# Patient Record
Sex: MALE | Race: WHITE | NOT HISPANIC OR LATINO | Employment: UNEMPLOYED | URBAN - NONMETROPOLITAN AREA
[De-identification: names, ages, dates, MRNs, and addresses within clinical notes are randomized per-mention and may not be internally consistent; named-entity substitution may affect disease eponyms.]

---

## 2018-01-01 ENCOUNTER — TELEPHONE (OUTPATIENT)
Dept: CARDIOLOGY | Facility: CLINIC | Age: 58
End: 2018-01-01

## 2018-01-01 ENCOUNTER — OUTSIDE FACILITY SERVICE (OUTPATIENT)
Dept: CARDIOLOGY | Facility: CLINIC | Age: 58
End: 2018-01-01

## 2018-01-01 ENCOUNTER — OFFICE VISIT (OUTPATIENT)
Dept: CARDIOLOGY | Facility: CLINIC | Age: 58
End: 2018-01-01

## 2018-01-01 ENCOUNTER — HOSPITAL ENCOUNTER (OUTPATIENT)
Dept: CARDIOLOGY | Facility: HOSPITAL | Age: 58
Discharge: HOME | End: 2018-06-28
Admitting: PHYSICIAN ASSISTANT

## 2018-01-01 ENCOUNTER — PRIOR AUTHORIZATION (OUTPATIENT)
Dept: CARDIOLOGY | Facility: CLINIC | Age: 58
End: 2018-01-01

## 2018-01-01 ENCOUNTER — LAB (OUTPATIENT)
Dept: LAB | Facility: HOSPITAL | Age: 58
End: 2018-01-01

## 2018-01-01 ENCOUNTER — HOSPITAL ENCOUNTER (OUTPATIENT)
Dept: CARDIOLOGY | Facility: HOSPITAL | Age: 58
Discharge: HOME | End: 2018-07-23

## 2018-01-01 ENCOUNTER — HOSPITAL ENCOUNTER (OUTPATIENT)
Dept: CARDIOLOGY | Facility: HOSPITAL | Age: 58
Discharge: HOME | End: 2018-07-23
Admitting: INTERNAL MEDICINE

## 2018-01-01 ENCOUNTER — OFFICE VISIT (OUTPATIENT)
Dept: CARDIAC SURGERY | Facility: CLINIC | Age: 58
End: 2018-01-01

## 2018-01-01 ENCOUNTER — APPOINTMENT (OUTPATIENT)
Dept: CARDIOLOGY | Facility: HOSPITAL | Age: 58
End: 2018-01-01

## 2018-01-01 VITALS
OXYGEN SATURATION: 97 % | HEIGHT: 68 IN | DIASTOLIC BLOOD PRESSURE: 94 MMHG | HEART RATE: 94 BPM | WEIGHT: 183.8 LBS | SYSTOLIC BLOOD PRESSURE: 152 MMHG | BODY MASS INDEX: 27.86 KG/M2

## 2018-01-01 VITALS
HEIGHT: 68 IN | SYSTOLIC BLOOD PRESSURE: 151 MMHG | WEIGHT: 183.4 LBS | BODY MASS INDEX: 27.8 KG/M2 | DIASTOLIC BLOOD PRESSURE: 87 MMHG | HEART RATE: 86 BPM | OXYGEN SATURATION: 98 %

## 2018-01-01 VITALS
DIASTOLIC BLOOD PRESSURE: 76 MMHG | OXYGEN SATURATION: 97 % | SYSTOLIC BLOOD PRESSURE: 121 MMHG | HEIGHT: 68 IN | BODY MASS INDEX: 28.19 KG/M2 | HEART RATE: 87 BPM | WEIGHT: 186 LBS

## 2018-01-01 VITALS
BODY MASS INDEX: 27.2 KG/M2 | WEIGHT: 179.5 LBS | HEART RATE: 105 BPM | SYSTOLIC BLOOD PRESSURE: 152 MMHG | DIASTOLIC BLOOD PRESSURE: 90 MMHG | HEIGHT: 68 IN | OXYGEN SATURATION: 97 %

## 2018-01-01 VITALS
BODY MASS INDEX: 28.01 KG/M2 | OXYGEN SATURATION: 98 % | SYSTOLIC BLOOD PRESSURE: 141 MMHG | HEIGHT: 68 IN | TEMPERATURE: 97.5 F | HEART RATE: 75 BPM | DIASTOLIC BLOOD PRESSURE: 91 MMHG | WEIGHT: 184.8 LBS

## 2018-01-01 DIAGNOSIS — R93.89 ABNORMAL CAROTID ULTRASOUND: ICD-10-CM

## 2018-01-01 DIAGNOSIS — R06.02 SOB (SHORTNESS OF BREATH): ICD-10-CM

## 2018-01-01 DIAGNOSIS — R00.2 PALPITATIONS: ICD-10-CM

## 2018-01-01 DIAGNOSIS — R06.02 SHORTNESS OF BREATH: ICD-10-CM

## 2018-01-01 DIAGNOSIS — R07.2 PRECORDIAL PAIN: Primary | ICD-10-CM

## 2018-01-01 DIAGNOSIS — I10 ESSENTIAL HYPERTENSION: ICD-10-CM

## 2018-01-01 DIAGNOSIS — Z00.6 EXAMINATION FOR NORMAL COMPARISON FOR CLINICAL RESEARCH: Primary | ICD-10-CM

## 2018-01-01 DIAGNOSIS — I25.119 CORONARY ARTERY DISEASE INVOLVING NATIVE CORONARY ARTERY OF NATIVE HEART WITH ANGINA PECTORIS (HCC): Primary | ICD-10-CM

## 2018-01-01 DIAGNOSIS — R07.2 PRECORDIAL PAIN: ICD-10-CM

## 2018-01-01 DIAGNOSIS — I77.9 BILATERAL CAROTID ARTERY DISEASE (HCC): ICD-10-CM

## 2018-01-01 DIAGNOSIS — Z72.0 TOBACCO ABUSE: Primary | ICD-10-CM

## 2018-01-01 DIAGNOSIS — I65.23 BILATERAL CAROTID ARTERY STENOSIS: Primary | ICD-10-CM

## 2018-01-01 DIAGNOSIS — F17.200 SMOKING: Primary | ICD-10-CM

## 2018-01-01 DIAGNOSIS — E78.5 HYPERLIPIDEMIA, UNSPECIFIED HYPERLIPIDEMIA TYPE: ICD-10-CM

## 2018-01-01 DIAGNOSIS — Z00.00 HEALTHCARE MAINTENANCE: ICD-10-CM

## 2018-01-01 DIAGNOSIS — I25.119 CORONARY ARTERY DISEASE INVOLVING NATIVE CORONARY ARTERY OF NATIVE HEART WITH ANGINA PECTORIS (HCC): ICD-10-CM

## 2018-01-01 DIAGNOSIS — F17.200 SMOKING: ICD-10-CM

## 2018-01-01 DIAGNOSIS — I42.9 CARDIOMYOPATHY, UNSPECIFIED TYPE (HCC): ICD-10-CM

## 2018-01-01 DIAGNOSIS — R42 DIZZINESS: ICD-10-CM

## 2018-01-01 LAB
ANION GAP SERPL CALCULATED.3IONS-SCNC: 3.6 MMOL/L (ref 3.6–11.2)
BUN BLD-MCNC: 14 MG/DL (ref 7–21)
BUN/CREAT SERPL: 13.9 (ref 7–25)
CALCIUM SPEC-SCNC: 9.5 MG/DL (ref 7.7–10)
CHLORIDE SERPL-SCNC: 106 MMOL/L (ref 99–112)
CO2 SERPL-SCNC: 25.4 MMOL/L (ref 24.3–31.9)
CREAT BLD-MCNC: 1.01 MG/DL (ref 0.43–1.29)
GFR SERPL CREATININE-BSD FRML MDRD: 75 ML/MIN/1.73
GLUCOSE BLD-MCNC: 94 MG/DL (ref 70–110)
MAGNESIUM SERPL-MCNC: 2.1 MG/DL (ref 1.7–2.6)
MAXIMAL PREDICTED HEART RATE: 160 BPM
MAXIMAL PREDICTED HEART RATE: 160 BPM
OSMOLALITY SERPL CALC.SUM OF ELEC: 270.3 MOSM/KG (ref 273–305)
POTASSIUM BLD-SCNC: 3.9 MMOL/L (ref 3.5–5.3)
SODIUM BLD-SCNC: 135 MMOL/L (ref 135–153)
STRESS TARGET HR: 136 BPM
STRESS TARGET HR: 136 BPM

## 2018-01-01 PROCEDURE — 36415 COLL VENOUS BLD VENIPUNCTURE: CPT

## 2018-01-01 PROCEDURE — 78472 GATED HEART PLANAR SINGLE: CPT

## 2018-01-01 PROCEDURE — 93306 TTE W/DOPPLER COMPLETE: CPT

## 2018-01-01 PROCEDURE — 93306 TTE W/DOPPLER COMPLETE: CPT | Performed by: INTERNAL MEDICINE

## 2018-01-01 PROCEDURE — 93880 EXTRACRANIAL BILAT STUDY: CPT | Performed by: INTERNAL MEDICINE

## 2018-01-01 PROCEDURE — 93000 ELECTROCARDIOGRAM COMPLETE: CPT | Performed by: PHYSICIAN ASSISTANT

## 2018-01-01 PROCEDURE — 0 TECHNETIUM LABELED RED BLOOD CELLS: Performed by: INTERNAL MEDICINE

## 2018-01-01 PROCEDURE — 25010000002 HEPARIN FLUSH (PORCINE) 100 UNIT/ML SOLUTION: Performed by: INTERNAL MEDICINE

## 2018-01-01 PROCEDURE — 99203 OFFICE O/P NEW LOW 30 MIN: CPT | Performed by: PHYSICIAN ASSISTANT

## 2018-01-01 PROCEDURE — 99214 OFFICE O/P EST MOD 30 MIN: CPT | Performed by: NURSE PRACTITIONER

## 2018-01-01 PROCEDURE — 80048 BASIC METABOLIC PNL TOTAL CA: CPT | Performed by: NURSE PRACTITIONER

## 2018-01-01 PROCEDURE — 93880 EXTRACRANIAL BILAT STUDY: CPT

## 2018-01-01 PROCEDURE — A9560 TC99M LABELED RBC: HCPCS | Performed by: INTERNAL MEDICINE

## 2018-01-01 PROCEDURE — 99205 OFFICE O/P NEW HI 60 MIN: CPT | Performed by: THORACIC SURGERY (CARDIOTHORACIC VASCULAR SURGERY)

## 2018-01-01 PROCEDURE — 78472 GATED HEART PLANAR SINGLE: CPT | Performed by: INTERNAL MEDICINE

## 2018-01-01 PROCEDURE — 93458 L HRT ARTERY/VENTRICLE ANGIO: CPT | Performed by: INTERNAL MEDICINE

## 2018-01-01 PROCEDURE — 83735 ASSAY OF MAGNESIUM: CPT | Performed by: NURSE PRACTITIONER

## 2018-01-01 RX ORDER — ATORVASTATIN CALCIUM 20 MG/1
20 TABLET, FILM COATED ORAL DAILY
Qty: 90 TABLET | Refills: 3 | Status: SHIPPED | OUTPATIENT
Start: 2018-01-01 | End: 2018-01-01 | Stop reason: SDUPTHER

## 2018-01-01 RX ORDER — NITROGLYCERIN 0.4 MG/1
TABLET SUBLINGUAL
Qty: 25 TABLET | Refills: 3 | Status: SHIPPED | OUTPATIENT
Start: 2018-01-01

## 2018-01-01 RX ORDER — ATORVASTATIN CALCIUM 40 MG/1
40 TABLET, FILM COATED ORAL NIGHTLY
Qty: 30 TABLET | Refills: 11 | Status: SHIPPED | OUTPATIENT
Start: 2018-01-01 | End: 2018-01-01 | Stop reason: SDUPTHER

## 2018-01-01 RX ORDER — METOPROLOL TARTRATE 50 MG/1
50 TABLET, FILM COATED ORAL 2 TIMES DAILY
COMMUNITY
Start: 2018-01-01

## 2018-01-01 RX ORDER — CLOPIDOGREL BISULFATE 75 MG/1
75 TABLET ORAL DAILY
Qty: 30 TABLET | Refills: 11 | Status: SHIPPED | OUTPATIENT
Start: 2018-01-01

## 2018-01-01 RX ORDER — ISOSORBIDE MONONITRATE 30 MG/1
30 TABLET, EXTENDED RELEASE ORAL DAILY
Qty: 30 TABLET | Refills: 11 | Status: SHIPPED | OUTPATIENT
Start: 2018-01-01

## 2018-01-01 RX ORDER — ATORVASTATIN CALCIUM 40 MG/1
40 TABLET, FILM COATED ORAL NIGHTLY
Qty: 30 TABLET | Refills: 11 | Status: SHIPPED | OUTPATIENT
Start: 2018-01-01

## 2018-01-01 RX ORDER — NICOTINE 21 MG/24HR
1 PATCH, TRANSDERMAL 24 HOURS TRANSDERMAL EVERY 24 HOURS
Qty: 30 PATCH | Refills: 0 | Status: SHIPPED | OUTPATIENT
Start: 2018-01-01 | End: 2018-01-01

## 2018-01-01 RX ORDER — ISOSORBIDE MONONITRATE 30 MG/1
TABLET, EXTENDED RELEASE ORAL
Qty: 30 TABLET | Refills: 11 | Status: SHIPPED | OUTPATIENT
Start: 2018-01-01 | End: 2018-01-01 | Stop reason: SDUPTHER

## 2018-01-01 RX ADMIN — TECHNETIUM TC 99M-LABELED RED BLOOD CELLS 1 DOSE: KIT at 16:00

## 2018-01-01 RX ADMIN — HEPARIN SODIUM (PORCINE) LOCK FLUSH IV SOLN 100 UNIT/ML 10 UNITS: 100 SOLUTION at 16:00

## 2018-05-31 NOTE — PATIENT INSTRUCTIONS
BMI for Adults  Body mass index (BMI) is a number that is calculated from a person's weight and height. In most adults, the number is used to find how much of an adult's weight is made up of fat. BMI is not as accurate as a direct measure of body fat.  How is BMI calculated?  BMI is calculated by dividing weight in kilograms by height in meters squared. It can also be calculated by dividing weight in pounds by height in inches squared, then multiplying the resulting number by 703. Charts are available to help you find your BMI quickly and easily without doing this calculation.  How is BMI interpreted?  Health care professionals use BMI charts to identify whether an adult is underweight, at a normal weight, or overweight based on the following guidelines:  · Underweight: BMI less than 18.5.  · Normal weight: BMI between 18.5 and 24.9.  · Overweight: BMI between 25 and 29.9.  · Obese: BMI of 30 and above.  BMI is usually interpreted the same for males and females.  Weight includes both fat and muscle, so someone with a muscular build, such as an athlete, may have a BMI that is higher than 24.9. In cases like these, BMI may not accurately depict body fat. To determine if excess body fat is the cause of a BMI of 25 or higher, further assessments may need to be done by a health care provider.  Why is BMI a useful tool?  BMI is used to identify a possible weight problem that may be related to a medical problem or may increase the risk for medical problems. BMI can also be used to promote changes to reach a healthy weight.  This information is not intended to replace advice given to you by your health care provider. Make sure you discuss any questions you have with your health care provider.  Document Released: 08/29/2005 Document Revised: 04/27/2017 Document Reviewed: 05/15/2015  [employer] Interactive Patient Education © 2017 [employer]  For more information:    Quit Now  [place]  1-800-QUIT-NOW  https://[place].quitlogix.org/en-[place]/  Steps to Quit Smoking  Smoking tobacco can be harmful to your health and can affect almost every organ in your body. Smoking puts you, and those around you, at risk for developing many serious chronic diseases. Quitting smoking is difficult, but it is one of the best things that you can do for your health. It is never too late to quit.  What are the benefits of quitting smoking?  When you quit smoking, you lower your risk of developing serious diseases and conditions, such as:  · Lung cancer or lung disease, such as COPD.  · Heart disease.  · Stroke.  · Heart attack.  · Infertility.  · Osteoporosis and bone fractures.  Additionally, symptoms such as coughing, wheezing, and shortness of breath may get better when you quit. You may also find that you get sick less often because your body is stronger at fighting off colds and infections. If you are pregnant, quitting smoking can help to reduce your chances of having a baby of low birth weight.  How do I get ready to quit?  When you decide to quit smoking, create a plan to make sure that you are successful. Before you quit:  · Pick a date to quit. Set a date within the next two weeks to give you time to prepare.  · Write down the reasons why you are quitting. Keep this list in places where you will see it often, such as on your bathroom mirror or in your car or wallet.  · Identify the people, places, things, and activities that make you want to smoke (triggers) and avoid them. Make sure to take these actions:  ¨ Throw away all cigarettes at home, at work, and in your car.  ¨ Throw away smoking accessories, such as ashtrays and lighters.  ¨ Clean your car and make sure to empty the ashtray.  ¨ Clean your home, including curtains and carpets.  · Tell your family, friends, and coworkers that you are quitting. Support from your loved ones can make quitting easier.  · Talk with your health care provider about  your options for quitting smoking.  · Find out what treatment options are covered by your health insurance.  What strategies can I use to quit smoking?  Talk with your healthcare provider about different strategies to quit smoking. Some strategies include:  · Quitting smoking altogether instead of gradually lessening how much you smoke over a period of time. Research shows that quitting “cold turkey” is more successful than gradually quitting.  · Attending in-person counseling to help you build problem-solving skills. You are more likely to have success in quitting if you attend several counseling sessions. Even short sessions of 10 minutes can be effective.  · Finding resources and support systems that can help you to quit smoking and remain smoke-free after you quit. These resources are most helpful when you use them often. They can include:  ¨ Online chats with a counselor.  ¨ Telephone quitlines.  ¨ Printed self-help materials.  ¨ Support groups or group counseling.  ¨ Text messaging programs.  ¨ Mobile phone applications.  · Taking medicines to help you quit smoking. (If you are pregnant or breastfeeding, talk with your health care provider first.) Some medicines contain nicotine and some do not. Both types of medicines help with cravings, but the medicines that include nicotine help to relieve withdrawal symptoms. Your health care provider may recommend:  ¨ Nicotine patches, gum, or lozenges.  ¨ Nicotine inhalers or sprays.  ¨ Non-nicotine medicine that is taken by mouth.  Talk with your health care provider about combining strategies, such as taking medicines while you are also receiving in-person counseling. Using these two strategies together makes you more likely to succeed in quitting than if you used either strategy on its own.  If you are pregnant or breastfeeding, talk with your health care provider about finding counseling or other support strategies to quit smoking. Do not take medicine to help you  quit smoking unless told to do so by your health care provider.  What things can I do to make it easier to quit?  Quitting smoking might feel overwhelming at first, but there is a lot that you can do to make it easier. Take these important actions:  · Reach out to your family and friends and ask that they support and encourage you during this time. Call telephone quitlines, reach out to support groups, or work with a counselor for support.  · Ask people who smoke to avoid smoking around you.  · Avoid places that trigger you to smoke, such as bars, parties, or smoke-break areas at work.  · Spend time around people who do not smoke.  · Lessen stress in your life, because stress can be a smoking trigger for some people. To lessen stress, try:  ¨ Exercising regularly.  ¨ Deep-breathing exercises.  ¨ Yoga.  ¨ Meditating.  ¨ Performing a body scan. This involves closing your eyes, scanning your body from head to toe, and noticing which parts of your body are particularly tense. Purposefully relax the muscles in those areas.  · Download or purchase mobile phone or tablet apps (applications) that can help you stick to your quit plan by providing reminders, tips, and encouragement. There are many free apps, such as QuitGuide from the CDC (Centers for Disease Control and Prevention). You can find other support for quitting smoking (smoking cessation) through smokefree.gov and other websites.  How will I feel when I quit smoking?  Within the first 24 hours of quitting smoking, you may start to feel some withdrawal symptoms. These symptoms are usually most noticeable 2-3 days after quitting, but they usually do not last beyond 2-3 weeks. Changes or symptoms that you might experience include:  · Mood swings.  · Restlessness, anxiety, or irritation.  · Difficulty concentrating.  · Dizziness.  · Strong cravings for sugary foods in addition to nicotine.  · Mild weight gain.  · Constipation.  · Nausea.  · Coughing or a sore  throat.  · Changes in how your medicines work in your body.  · A depressed mood.  · Difficulty sleeping (insomnia).  After the first 2-3 weeks of quitting, you may start to notice more positive results, such as:  · Improved sense of smell and taste.  · Decreased coughing and sore throat.  · Slower heart rate.  · Lower blood pressure.  · Clearer skin.  · The ability to breathe more easily.  · Fewer sick days.  Quitting smoking is very challenging for most people. Do not get discouraged if you are not successful the first time. Some people need to make many attempts to quit before they achieve long-term success. Do your best to stick to your quit plan, and talk with your health care provider if you have any questions or concerns.  This information is not intended to replace advice given to you by your health care provider. Make sure you discuss any questions you have with your health care provider.  Document Released: 12/12/2002 Document Revised: 08/15/2017 Document Reviewed: 05/03/2016  [employer] Interactive Patient Education © 2017 [employer]

## 2018-05-31 NOTE — PROGRESS NOTES
Subjective   Keshav Orta Jr. is a 58 y.o. male     Chief Complaint   Patient presents with   • Establish Care     patient appears in office today to est cardiac care    • Chest Pain   • Shortness of Breath       HPI    The patient presents today for initial evaluation.  He presents in the setting of chest discomfort, shortness of air, hypertension, and tachycardia.  The patient denies cardiovascular history.  He does tell me that several days ago, proximally up to 2 weeks ago, he was seen through an urgent treatment Center in [place], [place].  He presented after significant onset of chest tightness, left upper extremity discomfort, nausea, and diaphoresis.  He also had hypertension and tachycardia upon arrival and evaluation.  Cardiac enzymes were performed.  Cardiac enzymes ×2 per protocol were unremarkable.  It was recommended however in the setting of abnormal EKG, that he be referred to [place], [place], for consideration of cardiac evaluation.  With review of that note, the provider at that time reported that he had elevated troponin.  I do not see elevated troponin on 2 separate draws.  He does have diffuse nonspecific ST and T-wave changes however on his baseline EKG.    Since evaluation to the urgent treatment Center, the patient has continued to have chest discomfort.  He tells me that with activity, he will have intense precordial chest pressure.  This again is referred occasionally to the left neck and left upper extremity.  He gets very dyspneic.  Once resting, his chest pain resolves.  The patient has no PND orthopnea otherwise.  He reports episodes of tachycardia, typically associated with stress/anxiety.  When checked, he is hypertensive at that time as well.  The patient denies dizziness or syncope.  He presents today for evaluation of his symptoms and abnormal EKG.    Current Outpatient Prescriptions   Medication Sig Dispense Refill   • aspirin 81 MG tablet Take 1 tablet by mouth Daily. 30  tablet 11   • atorvastatin (LIPITOR) 20 MG tablet Take 1 tablet by mouth Daily. 90 tablet 3   • clopidogrel (PLAVIX) 75 MG tablet Take 1 tablet by mouth Daily. 30 tablet 11   • metoprolol tartrate (LOPRESSOR) 25 MG tablet Take 1 tablet by mouth 2 (Two) Times a Day. 60 tablet 11   • nitroglycerin (NITROSTAT) 0.4 MG SL tablet 1 under the tongue as needed for angina, may repeat q5mins for up three doses 25 tablet 3     No current facility-administered medications for this visit.        Patient has no known allergies.    Past Medical History:   Diagnosis Date   • Hypertension    • Myocardial infarction        Social History     Social History   • Marital status:      Spouse name: N/A   • Number of children: N/A   • Years of education: N/A     Occupational History   • Not on file.     Social History Main Topics   • Smoking status: Current Every Day Smoker     Packs/day: 1.50     Years: 45.00     Types: Cigarettes   • Smokeless tobacco: Never Used   • Alcohol use No   • Drug use: No   • Sexual activity: Defer     Other Topics Concern   • Not on file     Social History Narrative   • No narrative on file       Family History   Problem Relation Age of Onset   • COPD Mother    • Heart disease Mother    • Hypertension Father    • Myasthenia gravis Father        Review of Systems   Constitutional: Positive for fatigue (easily fatigued ).   HENT: Positive for congestion (head congestion due to allergies) and rhinorrhea (runny nose due to allergies). Negative for sneezing (sneezing due to allergies) and sore throat.    Eyes: Negative.  Negative for visual disturbance.   Respiratory: Positive for cough (dry cough due to tobacco use), chest tightness (chest tightness with CP episodes) and shortness of breath (easily SOA ; worse on exertion ). Negative for apnea and wheezing.    Cardiovascular: Positive for chest pain (CP/tightness/chest pressure), palpitations (frequent palpitations/flutters) and leg swelling (BLE  "swelling/edema with exertion ).   Gastrointestinal: Positive for nausea (nausea with CP ). Negative for abdominal distention, abdominal pain and vomiting.   Endocrine: Negative.  Negative for cold intolerance, heat intolerance, polyphagia and polyuria.   Genitourinary: Negative.  Negative for difficulty urinating, frequency and urgency.   Musculoskeletal: Negative.  Negative for arthralgias, back pain, myalgias, neck pain and neck stiffness.   Skin: Negative.  Negative for rash and wound.   Allergic/Immunologic: Negative.  Negative for environmental allergies and food allergies.   Neurological: Positive for weakness (generalized ) and numbness (\"R side of head feels numb\"). Negative for dizziness, light-headedness and headaches.   Hematological: Negative.  Does not bruise/bleed easily.   Psychiatric/Behavioral: Positive for agitation (easily agitated ). Negative for confusion and sleep disturbance (denies waking up smothering/SOA). The patient is nervous/anxious (easily nervous/anxious).        Objective     Vitals:    05/31/18 1455   BP: 152/90   BP Location: Left arm   Patient Position: Sitting   Pulse: 105   SpO2: 97%   Weight: 81.4 kg (179 lb 8 oz)   Height: 172.7 cm (68\")        /90 (BP Location: Left arm, Patient Position: Sitting)   Pulse 105   Ht 172.7 cm (68\")   Wt 81.4 kg (179 lb 8 oz)   SpO2 97%   BMI 27.29 kg/m²      Lab Results (most recent)     None          Physical Exam   Constitutional: He is oriented to person, place, and time. He appears well-developed and well-nourished. No distress.   HENT:   Head: Normocephalic and atraumatic.   Eyes: Conjunctivae and EOM are normal. Pupils are equal, round, and reactive to light.   Neck: Normal range of motion. Neck supple. No JVD present. No tracheal deviation present.   Cardiovascular: Normal rate, regular rhythm, normal heart sounds and intact distal pulses.    Pulmonary/Chest: Effort normal and breath sounds normal.   Abdominal: Soft. Bowel " sounds are normal. He exhibits no distension and no mass. There is no tenderness. There is no rebound and no guarding.   Musculoskeletal: Normal range of motion. He exhibits no edema, tenderness or deformity.   Neurological: He is alert and oriented to person, place, and time.   Skin: Skin is warm and dry. No rash noted. No erythema. No pallor.   Psychiatric: He has a normal mood and affect. His behavior is normal. Judgment and thought content normal.   Nursing note and vitals reviewed.      Procedure     ECG 12 Lead  Date/Time: 5/31/2018 3:01 PM  Performed by: CHARLINE RODRIGUEZ  Authorized by: CHARLINE RODRIGUEZ   Comments: Chest pain   SOA    Sinus rhythm, normal axis, nonspecific IVCD, possible inferior wall MI age indeterminate, nonspecific ST and T-wave changes, left atrial enlargement, no acute changes noted.                 Assessment/Plan      Diagnosis Plan   1. Precordial pain  ECG 12 Lead    aspirin 81 MG tablet    clopidogrel (PLAVIX) 75 MG tablet    atorvastatin (LIPITOR) 20 MG tablet    nitroglycerin (NITROSTAT) 0.4 MG SL tablet    metoprolol tartrate (LOPRESSOR) 25 MG tablet   2. Essential hypertension  aspirin 81 MG tablet    clopidogrel (PLAVIX) 75 MG tablet    atorvastatin (LIPITOR) 20 MG tablet    nitroglycerin (NITROSTAT) 0.4 MG SL tablet    metoprolol tartrate (LOPRESSOR) 25 MG tablet   3. SOB (shortness of breath)  aspirin 81 MG tablet    clopidogrel (PLAVIX) 75 MG tablet    atorvastatin (LIPITOR) 20 MG tablet    nitroglycerin (NITROSTAT) 0.4 MG SL tablet    metoprolol tartrate (LOPRESSOR) 25 MG tablet   4. Palpitations  aspirin 81 MG tablet    clopidogrel (PLAVIX) 75 MG tablet    atorvastatin (LIPITOR) 20 MG tablet    nitroglycerin (NITROSTAT) 0.4 MG SL tablet    metoprolol tartrate (LOPRESSOR) 25 MG tablet     We have discussed further evaluation in terms of symptoms with the patient.  He is going need ischemia assessment at some time.  I'm going to put him on cardioprotective medications for  now.  We are starting aspirin.  I have asked that he start Plavix 75 mg daily.  We will also start Lipitor 20 mg daily at bedtime.  For further cardioprotection, we will start metoprolol 25 mg twice a day.  This hopefully will help with hypertension and tachycardia.  The patient will have Nitrol also called in, as needed.  If symptoms persist, we will have to consider testing at that time.  He will eventually need an echocardiogram.  Further pending all the above however.  For any complications, he will call right away.  For breakthrough symptoms, he will go to the emergency room.           I advised the patient of the risks in continuing to use tobacco, and I provided this patient with smoking cessation educational materials.    During this visit, I spent <3 minutes counseling the patient regarding smoking cessation.     Patient's Body mass index is 27.29 kg/m². BMI is above normal parameters. Recommendations include: educational material and referral to primary care.           Electronically signed by:

## 2018-06-06 NOTE — TELEPHONE ENCOUNTER
Per verbal order John Gong PA-C patient needs to be scheduled for echo now and LHC. Orders will be placed within system . -EMIL;SREEKANTH    I called patient and discussed insurance coverage with him, SHANI Arthur pulled insurance from [facility] website and will be putting all this into patients chart as well, so echo and LHC can be scheduled. -EMIL;SREEKANTH

## 2018-06-06 NOTE — TELEPHONE ENCOUNTER
----- Message from Cony Domínguez LPN sent at 6/6/2018 10:06 AM EDT -----  Contact: ALYSSA   DAUGHTER  Please ask Charline if he is wanting to schedule patient for Cardiac Cath. There is nothing in the last office note or an order for cath only need for further ischemic cardiac evaluation.   ----- Message -----  From: Meng Panchal  Sent: 6/6/2018   9:47 AM  To: Cony Domínguez LPN    PATIENT DAUGHTER CALLED AND STATED THAT THE PATIENT NOW HAS INSURANCE AND REQUESTING THAT HIS HEART CATH BE SCHEDULED  THAT CHARLINE RODRIGUEZ WANTED HIM TO HAVE DONE.

## 2018-07-02 PROBLEM — I10 ESSENTIAL HYPERTENSION: Status: ACTIVE | Noted: 2018-01-01

## 2018-07-02 PROBLEM — I25.119 CORONARY ARTERY DISEASE INVOLVING NATIVE CORONARY ARTERY OF NATIVE HEART WITH ANGINA PECTORIS (HCC): Status: ACTIVE | Noted: 2018-01-01

## 2018-07-02 PROBLEM — F17.200 SMOKING: Status: ACTIVE | Noted: 2018-01-01

## 2018-07-02 PROBLEM — E78.5 HYPERLIPIDEMIA: Status: ACTIVE | Noted: 2018-01-01

## 2018-07-02 NOTE — PATIENT INSTRUCTIONS
Obesity, Adult  Obesity is the condition of having too much total body fat. Being overweight or obese means that your weight is greater than what is considered healthy for your body size. Obesity is determined by a measurement called BMI. BMI is an estimate of body fat and is calculated from height and weight. For adults, a BMI of 30 or higher is considered obese.  Obesity can eventually lead to other health concerns and major illnesses, including:  · Stroke.  · Coronary artery disease (CAD).  · Type 2 diabetes.  · Some types of cancer, including cancers of the colon, breast, uterus, and gallbladder.  · Osteoarthritis.  · High blood pressure (hypertension).  · High cholesterol.  · Sleep apnea.  · Gallbladder stones.  · Infertility problems.    What are the causes?  The main cause of obesity is taking in (consuming) more calories than your body uses for energy. Other factors that contribute to this condition may include:  · Being born with genes that make you more likely to become obese.  · Having a medical condition that causes obesity. These conditions include:  ? Hypothyroidism.  ? Polycystic ovarian syndrome (PCOS).  ? Binge-eating disorder.  ? Cushing syndrome.  · Taking certain medicines, such as steroids, antidepressants, and seizure medicines.  · Not being physically active (sedentary lifestyle).  · Living where there are limited places to exercise safely or buy healthy foods.  · Not getting enough sleep.    What increases the risk?  The following factors may increase your risk of this condition:  · Having a family history of obesity.  · Being a woman of -American descent.  · Being a man of  descent.    What are the signs or symptoms?  Having excessive body fat is the main symptom of this condition.  How is this diagnosed?  This condition may be diagnosed based on:  · Your symptoms.  · Your medical history.  · A physical exam. Your health care provider may measure:  ? Your BMI. If you are an  adult with a BMI between 25 and less than 30, you are considered overweight. If you are an adult with a BMI of 30 or higher, you are considered obese.  ? The distances around your hips and your waist (circumferences). These may be compared to each other to help diagnose your condition.  ? Your skinfold thickness. Your health care provider may gently pinch a fold of your skin and measure it.    How is this treated?  Treatment for this condition often includes changing your lifestyle. Treatment may include some or all of the following:  · Dietary changes. Work with your health care provider and a dietitian to set a weight-loss goal that is healthy and reasonable for you. Dietary changes may include eating:  ? Smaller portions. A portion size is the amount of a particular food that is healthy for you to eat at one time. This varies from person to person.  ? Low-calorie or low-fat options.  ? More whole grains, fruits, and vegetables.  · Regular physical activity. This may include aerobic activity (cardio) and strength training.  · Medicine to help you lose weight. Your health care provider may prescribe medicine if you are unable to lose 1 pound a week after 6 weeks of eating more healthily and doing more physical activity.  · Surgery. Surgical options may include gastric banding and gastric bypass. Surgery may be done if:  ? Other treatments have not helped to improve your condition.  ? You have a BMI of 40 or higher.  ? You have life-threatening health problems related to obesity.    Follow these instructions at home:    Eating and drinking    · Follow recommendations from your health care provider about what you eat and drink. Your health care provider may advise you to:  ? Limit fast foods, sweets, and processed snack foods.  ? Choose low-fat options, such as low-fat milk instead of whole milk.  ? Eat 5 or more servings of fruits or vegetables every day.  ? Eat at home more often. This gives you more control over  what you eat.  ? Choose healthy foods when you eat out.  ? Learn what a healthy portion size is.  ? Keep low-fat snacks on hand.  ? Avoid sugary drinks, such as soda, fruit juice, iced tea sweetened with sugar, and flavored milk.  ? Eat a healthy breakfast.  · Drink enough water to keep your urine clear or pale yellow.  · Do not go without eating for long periods of time (do not fast) or follow a fad diet. Fasting and fad diets can be unhealthy and even dangerous.  Physical Activity  · Exercise regularly, as told by your health care provider. Ask your health care provider what types of exercise are safe for you and how often you should exercise.  · Warm up and stretch before being active.  · Cool down and stretch after being active.  · Rest between periods of activity.  Lifestyle  · Limit the time that you spend in front of your TV, computer, or video game system.  · Find ways to reward yourself that do not involve food.  · Limit alcohol intake to no more than 1 drink a day for nonpregnant women and 2 drinks a day for men. One drink equals 12 oz of beer, 5 oz of wine, or 1½ oz of hard liquor.  General instructions  · Keep a weight loss journal to keep track of the food you eat and how much you exercise you get.  · Take over-the-counter and prescription medicines only as told by your health care provider.  · Take vitamins and supplements only as told by your health care provider.  · Consider joining a support group. Your health care provider may be able to recommend a support group.  · Keep all follow-up visits as told by your health care provider. This is important.  Contact a health care provider if:  · You are unable to meet your weight loss goal after 6 weeks of dietary and lifestyle changes.  This information is not intended to replace advice given to you by your health care provider. Make sure you discuss any questions you have with your health care provider.  Document Released: 01/25/2006 Document Revised:  05/22/2017 Document Reviewed: 10/05/2016  [employer] Interactive Patient Education © 2018 [employer]  MyPlate from [employer]  The general, healthful diet is based on the 2010 Dietary Guidelines for Americans. The amount of food you need to eat from each food group depends on your age, sex, and level of physical activity and can be individualized by a dietitian. Go to ChooseMyPlate.gov for more information.  What do I need to know about the MyPlate plan?  · Enjoy your food, but eat less.  · Avoid oversized portions.  ? ½ of your plate should include fruits and vegetables.  ? ¼ of your plate should be grains.  ? ¼ of your plate should be protein.  Grains  · Make at least half of your grains whole grains.  · For a 2,000 calorie daily food plan, eat 6 oz every day.  · 1 oz is about 1 slice bread, 1 cup cereal, or ½ cup cooked rice, cereal, or pasta.  Vegetables  · Make half your plate fruits and vegetables.  · For a 2,000 calorie daily food plan, eat 2½ cups every day.  · 1 cup is about 1 cup raw or cooked vegetables or vegetable juice or 2 cups raw leafy greens.  Fruits  · Make half your plate fruits and vegetables.  · For a 2,000 calorie daily food plan, eat 2 cups every day.  · 1 cup is about 1 cup fruit or 100% fruit juice or ½ cup dried fruit.  Protein  · For a 2,000 calorie daily food plan, eat 5½ oz every day.  · 1 oz is about 1 oz meat, poultry, or fish, ¼ cup cooked beans, 1 egg, 1 Tbsp peanut butter, or ½ oz nuts or seeds.  Dairy  · Switch to fat-free or low-fat (1%) milk.  · For a 2,000 calorie daily food plan, eat 3 cups every day.  · 1 cup is about 1 cup milk or yogurt or soy milk (soy beverage), 1½ oz natural cheese, or 2 oz processed cheese.  Fats, Oils, and Empty Calories  · Only small amounts of oils are recommended.  · Empty calories are calories from solid fats or added sugars.  · Compare sodium in foods like soup, bread, and frozen meals. Choose the foods with lower numbers.  · Drink water instead of  sugary drinks.  What foods can I eat?  Grains  Whole grains such as whole wheat, quinoa, millet, and bulgur. Bread, rolls, and pasta made from whole grains. Brown or wild rice. Hot or cold cereals made from whole grains and without added sugar.  Vegetables  All fresh vegetables, especially fresh red, dark green, or orange vegetables. Peas and beans. Low-sodium frozen or canned vegetables prepared without added salt. Low-sodium vegetable juices.  Fruits  All fresh, frozen, and dried fruits. Canned fruit packed in water or fruit juice without added sugar. Fruit juices without added sugar.  Meats and Other Protein Sources  Boiled, baked, or grilled lean meat trimmed of fat. Skinless poultry. Fresh seafood and shellfish. Canned seafood packed in water. Unsalted nuts and unsalted nut butters. Tofu. Dried beans and pea. Eggs.  Dairy  Low-fat or fat-free milk, yogurt, and cheeses.  Sweets and Desserts  Frozen desserts made from low-fat milk.  Fats and Oils  Olive, peanut, and canola oils and margarine. Salad dressing and mayonnaise made from these oils.  Other  Soups and casseroles made from allowed ingredients and without added fat or salt.  The items listed above may not be a complete list of recommended foods or beverages. Contact your dietitian for more options.  What foods are not recommended?  Grains  Sweetened, low-fiber cereals. Packaged baked goods. Snack crackers and chips. Cheese crackers, butter crackers, and biscuits. Frozen waffles, sweet breads, doughnuts, pastries, packaged baking mixes, pancakes, cakes, and cookies.  Vegetables  Regular canned or frozen vegetables or vegetables prepared with salt. Canned tomatoes. Canned tomato sauce. Fried vegetables. Vegetables in cream sauce or cheese sauce.  Fruits  Fruits packed in syrup or made with added sugar.  Meats and Other Protein Sources  Marbled or fatty meats such as ribs. Poultry with skin. Fried meats, poultry, eggs, or fish. Sausages, hot dogs, and deli  meats such as pastrami, bologna, or salami.  Dairy  Whole milk, cream, cheeses made from whole milk, sour cream. Ice cream or yogurt made from whole milk or with added sugar.  Beverages  For adults, no more than one alcoholic drink per day. Regular soft drinks or other sugary beverages. Juice drinks.  Sweets and Desserts  Sugary or fatty desserts, candy, and other sweets.  Fats and Oils  Solid shortening or partially hydrogenated oils. Solid margarine. Margarine that contains trans fats. Butter.  The items listed above may not be a complete list of foods and beverages to avoid. Contact your dietitian for more information.  This information is not intended to replace advice given to you by your health care provider. Make sure you discuss any questions you have with your health care provider.  Document Released: 01/06/2009 Document Revised: 05/25/2017 Document Reviewed: 11/26/2014  [employer] Interactive Patient Education © 2018 [employer]    Steps to Quit Smoking  Smoking tobacco can be bad for your health. It can also affect almost every organ in your body. Smoking puts you and people around you at risk for many serious long-lasting (chronic) diseases. Quitting smoking is hard, but it is one of the best things that you can do for your health. It is never too late to quit.  What are the benefits of quitting smoking?  When you quit smoking, you lower your risk for getting serious diseases and conditions. They can include:  Lung cancer or lung disease.  Heart disease.  Stroke.  Heart attack.  Not being able to have children (infertility).  Weak bones (osteoporosis) and broken bones (fractures).    If you have coughing, wheezing, and shortness of breath, those symptoms may get better when you quit. You may also get sick less often. If you are pregnant, quitting smoking can help to lower your chances of having a baby of low birth weight.  What can I do to help me quit smoking?  Talk with your doctor about what can help  you quit smoking. Some things you can do (strategies) include:  Quitting smoking totally, instead of slowly cutting back how much you smoke over a period of time.  Going to in-person counseling. You are more likely to quit if you go to many counseling sessions.  Using resources and support systems, such as:  Online chats with a counselor.  Phone quitlines.  Printed self-help materials.  Support groups or group counseling.  Text messaging programs.  Mobile phone apps or applications.  Taking medicines. Some of these medicines may have nicotine in them. If you are pregnant or breastfeeding, do not take any medicines to quit smoking unless your doctor says it is okay. Talk with your doctor about counseling or other things that can help you.    Talk with your doctor about using more than one strategy at the same time, such as taking medicines while you are also going to in-person counseling. This can help make quitting easier.  What things can I do to make it easier to quit?  Quitting smoking might feel very hard at first, but there is a lot that you can do to make it easier. Take these steps:  Talk to your family and friends. Ask them to support and encourage you.  Call phone quitlines, reach out to support groups, or work with a counselor.  Ask people who smoke to not smoke around you.  Avoid places that make you want (trigger) to smoke, such as:  Bars.  Parties.  Smoke-break areas at work.  Spend time with people who do not smoke.  Lower the stress in your life. Stress can make you want to smoke. Try these things to help your stress:  Getting regular exercise.  Deep-breathing exercises.  Yoga.  Meditating.  Doing a body scan. To do this, close your eyes, focus on one area of your body at a time from head to toe, and notice which parts of your body are tense. Try to relax the muscles in those areas.  Download or buy apps on your mobile phone or tablet that can help you stick to your quit plan. There are many free  apps, such as Quosis from the CDC (Centers for Disease Control and Prevention). You can find more support from smokefree.gov and other websites.    This information is not intended to replace advice given to you by your health care provider. Make sure you discuss any questions you have with your health care provider.  Document Released: 10/14/2010 Document Revised: 08/15/2017 Document Reviewed: 05/03/2016  [employer] Interactive Patient Education © 2018 [employer]    Coronary Artery Disease, Male  Coronary artery disease (CAD) is a condition in which the arteries that lead to the heart (coronary arteries) become narrow or blocked. The narrowing or blockage can lead to decreased blood flow to the heart. Prolonged reduced blood flow can cause a heart attack (myocardial infarction or MI). This condition may also be called coronary heart disease.  Because CAD is the leading cause of death in men, it is important to understand what causes this condition and how it is treated.  What are the causes?  CAD is most often caused by atherosclerosis. This is the buildup of fat and cholesterol (plaque) on the inside of the arteries. Over time, the plaque may narrow or block the artery, reducing blood flow to the heart. Plaque can also become weak and break off within a coronary artery and cause a sudden blockage. Other less common causes of CAD include:  · An embolism or blood clot in a coronary artery.  · A tearing of the artery (spontaneous coronary artery dissection).  · An aneurysm.  · Inflammation (vasculitis) in the artery wall.    What increases the risk?  The following factors may make you more likely to develop this condition:  · Age. Men over age 45 are at a greater risk of CAD.  · Family history of CAD.  · Gender. Men often develop CAD earlier in life than women.  · High blood pressure (hypertension).  · Diabetes.  · High cholesterol levels.  · Tobacco use.  · Excessive alcohol use.  · Lack of exercise.  · A diet  high in saturated and trans fats, such as fried food and processed meat.    Other possible risk factors include:  · High stress levels.  · Depression.  · Obesity.  · Sleep apnea.    What are the signs or symptoms?  Many people do not have any symptoms during the early stages of CAD. As the condition progresses, symptoms may include:  · Chest pain (angina). The pain can:  ? Feel like a crushing or squeezing, or a tightness, pressure, fullness, or heaviness in the chest.  ? Last more than a few minutes or can stop and recur. The pain tends to get worse with exercise or stress and to fade with rest.  · Pain in the arms, neck, jaw, or back.  · Unexplained heartburn or indigestion.  · Shortness of breath.  · Nausea or vomiting.  · Sudden light-headedness.  · Sudden cold sweats.  · Fluttering or fast heartbeat (palpitations).    How is this diagnosed?  This condition is diagnosed based on:  · Your family and medical history.  · A physical exam.  · Tests, including:  ? A test to check the electrical signals in your heart (electrocardiogram).  ? Exercise stress test. This looks for signs of blockage when the heart is stressed with exercise, such as running on a treadmill.  ? Pharmacologic stress test. This test looks for signs of blockage when the heart is being stressed with a medicine.  ? Blood tests.  ? Coronary angiogram. This is a procedure to look at the coronary arteries to see if there is any blockage. During this test, a dye is injected into your arteries so they appear on an X-ray.  ? A test that uses sound waves to take a picture of your heart (echocardiogram).  ? Chest X-ray.    How is this treated?  This condition may be treated by:  · Healthy lifestyle changes to reduce risk factors.  · Medicines such as:  ? Antiplatelet medicines and blood-thinning medicines, such as aspirin. These help to prevent blood clots.  ? Nitroglycerin.  ? Blood pressure medicines.  ? Cholesterol-lowering medicine.  · Coronary  angioplasty and stenting. During this procedure, a thin, flexible tube is inserted through a blood vessel and into a blocked artery. A balloon or similar device on the end of the tube is inflated to open up the artery. In some cases, a small, mesh tube (stent) is inserted into the artery to keep it open.  · Coronary artery bypass surgery. During this surgery, veins or arteries from other parts of the body are used to create a bypass around the blockage and allow blood to reach your heart.    Follow these instructions at home:  Medicines  · Take over-the-counter and prescription medicines only as told by your health care provider.  · Do not take the following medicines unless your health care provider approves:  ? NSAIDs, such as ibuprofen, naproxen, or celecoxib.  ? Vitamin supplements that contain vitamin A, vitamin E, or both.  Lifestyle  · Follow an exercise program approved by your health care provider. Aim for 150 minutes of moderate exercise or 75 minutes of vigorous exercise each week.  · Maintain a healthy weight or lose weight as approved by your health care provider.  · Rest when you are tired.  · Learn to manage stress or try to limit your stress. Ask your health care provider for suggestions if you need help.  · Get screened for depression and seek treatment, if needed.  · Do not use any products that contain nicotine or tobacco, such as cigarettes and e-cigarettes. If you need help quitting, ask your health care provider.  · Do not use illegal drugs.  Eating and drinking  · Follow a heart-healthy diet. A dietitian can help educate you about healthy food options and changes. In general, eat plenty of fruits and vegetables, lean meats, and whole grains.  · Avoid foods high in:  ? Sugar.  ? Salt (sodium).  ? Saturated fat, such as processed or fatty meat.  ? Trans fat, such as fried foods.  · Use healthy cooking methods such as roasting, grilling, broiling, baking, poaching, steaming, or stir-frying.  · If  "you drink alcohol, and your health care provider approves, limit your alcohol intake to no more than 2 drinks per day. One drink equals 12 ounces of beer, 5 ounces of wine, or 1½ ounces of hard liquor.  General instructions  · Manage any other health conditions, such as hypertension and diabetes. These conditions affect your heart.  · Your health care provider may ask you to monitor your blood pressure. Ideally, your blood pressure should be below 130/80.  · Keep all follow-up visits as told by your health care provider. This is important.  Get help right away if:  · You have pain in your chest, neck, arm, jaw, stomach, or back that:  ? Lasts more than a few minutes.  ? Is recurring.  ? Is not relieved by taking medicine under your tongue (sublingualnitroglycerin).  · You have too much (profuse) sweating without cause.  · You have unexplained:  ? Heartburn or indigestion.  ? Shortness of breath or difficulty breathing.  ? Fluttering or fast heartbeat (palpitations).  ? Nausea or vomiting.  ? Fatigue.  ? Feelings of nervousness or anxiety.  ? Weakness.  ? Diarrhea.  · You have sudden light-headedness or dizziness.  · You faint.  · You feel like hurting yourself or think about taking your own life.  These symptoms may represent a serious problem that is an emergency. Do not wait to see if the symptoms will go away. Get medical help right away. Call your local emergency services (911 in the [place] Do not drive yourself to the hospital.  Summary  · Coronary artery disease (CAD) is a process in which the arteries that lead to the heart (coronary arteries) become narrow or blocked. The narrowing or blockage can lead to a heart attack.  · Many people do not have any symptoms during the early stages of CAD. This is called \"silent CAD.\"  · CAD can be treated with lifestyle changes, medicines, surgery, or a combination of these treatments.  This information is not intended to replace advice given to you by your health care " provider. Make sure you discuss any questions you have with your health care provider.  Document Released: 07/15/2015 Document Revised: 12/08/2017 Document Reviewed: 12/08/2017  [employer] Interactive Patient Education © 2018 [employer]

## 2018-07-02 NOTE — PROGRESS NOTES
Subjective   Josiah Orta Jr. is a 58 y.o. male     Chief Complaint   Patient presents with   • Hyperlipidemia     presents as a follow up   • Hypertension       HPI    Problem list:    1.  CAD  1.1 left heart catheter 6/27/18-long total occlusion of the circumflex, ongoing circumflex at least 50% narrowing before providing a small nests the posterior lateral marginal branches with collaterals to the distal right coronary artery, proximal and midportion of the LAD 30-50% and 20-40% stenosis in the proximal and mid vessel and 50% or greater stenosis at the origins of the second and third diagonals, EF 30-35%, left ventricular end-diastolic pressure 28-30; referred to [facility] for possible  if not then may need CABG.  2.  Hypertension  3.  Dyslipidemia  4.  Shortness of breath  5.  Smoking habituation    Patient is a 58-year-old male who presents today for follow-up status post left heart catheter with his daughter her side.  Patient states he continues to fill like he has a weight in the center of his chest.  He says usually at least once a day.  He will get nauseated, lightheaded and diaphoretic he doesn't really notice shortness of breath he says.  He says the only time he notices that is when he lays down at night at times.  He says it does not radiate and can occur at any time.  He has taken approximately 3-4 nitroglycerin since he was here last any never has take more than one during an episode.  He denies any palpitations, fluttering, presyncope, syncope, PND or edema.  Patient states that he does get dizzy which she describes as a off balance sensation when he is walking.  He says that he does get short of breath with minimal activity even just walking to our lobby and is been this way for at least 6 months.  He's also had increase in fatigue as well.    We went over left heart catheter.  Patient's echo is still pending.    Current Outpatient Prescriptions   Medication Sig Dispense Refill   • aspirin  81 MG tablet Take 1 tablet by mouth Daily. 30 tablet 11   • atorvastatin (LIPITOR) 40 MG tablet Take 1 tablet by mouth Every Night. 30 tablet 11   • clopidogrel (PLAVIX) 75 MG tablet Take 1 tablet by mouth Daily. 30 tablet 11   • metoprolol tartrate (LOPRESSOR) 25 MG tablet Take 1 tablet by mouth 2 (Two) Times a Day. 60 tablet 11   • nitroglycerin (NITROSTAT) 0.4 MG SL tablet 1 under the tongue as needed for angina, may repeat q5mins for up three doses 25 tablet 3   • isosorbide mononitrate (IMDUR) 30 MG 24 hr tablet Take 1/2 tablet by mouth nightly for three days then increase to one tablet nightly 30 tablet 11   • technetium labeled red blood cells (ULTRATAG) Infuse 1 dose into a venous catheter Once for 1 dose. 1 dose 0     No current facility-administered medications for this visit.        ALLERGIES    Patient has no known allergies.    Past Medical History:   Diagnosis Date   • Hypertension    • Myocardial infarction        Social History     Social History   • Marital status:      Spouse name: N/A   • Number of children: N/A   • Years of education: N/A     Occupational History   • Not on file.     Social History Main Topics   • Smoking status: Current Every Day Smoker     Packs/day: 1.50     Years: 45.00     Types: Cigarettes   • Smokeless tobacco: Never Used   • Alcohol use No   • Drug use: No   • Sexual activity: Defer     Other Topics Concern   • Not on file     Social History Narrative   • No narrative on file       Family History   Problem Relation Age of Onset   • COPD Mother    • Heart disease Mother    • Hypertension Father    • Myasthenia gravis Father        Review of Systems   Constitutional: Positive for diaphoresis (with CP ) and fatigue.   HENT: Positive for rhinorrhea and sneezing.    Eyes: Positive for visual disturbance ( wears glasses ).   Respiratory: Positive for shortness of breath (just even walking to our lobby for approx 6 mos ). Negative for chest tightness.    Cardiovascular:  "Positive for chest pain (sometimes feels like he has a weight on his chest; at least once a day, no rad; nitro 3-4 x ). Negative for palpitations and leg swelling.   Gastrointestinal: Positive for nausea (with CP ). Negative for constipation, diarrhea and vomiting.   Endocrine: Negative.    Genitourinary: Negative for difficulty urinating.   Musculoskeletal: Negative for arthralgias, back pain and myalgias.   Skin: Negative.    Allergic/Immunologic: Positive for environmental allergies. Negative for food allergies.   Neurological: Positive for dizziness (when walking through house will get off balance at times ) and light-headedness (with CP ).   Hematological: Does not bruise/bleed easily.   Psychiatric/Behavioral: Positive for sleep disturbance. The patient is nervous/anxious.        Objective   /94 (BP Location: Left arm, Patient Position: Sitting)   Pulse 94   Ht 172.7 cm (68\")   Wt 83.4 kg (183 lb 12.8 oz)   SpO2 97%   BMI 27.95 kg/m²   Vitals:    07/02/18 1540   BP: 152/94   BP Location: Left arm   Patient Position: Sitting   Pulse: 94   SpO2: 97%   Weight: 83.4 kg (183 lb 12.8 oz)   Height: 172.7 cm (68\")      Lab Results (most recent)     None        Physical Exam   Constitutional: He is oriented to person, place, and time. Vital signs are normal. He appears well-developed and well-nourished. He is active and cooperative.   HENT:   Head: Normocephalic.   Eyes: Lids are normal.   Neck: Normal carotid pulses, no hepatojugular reflux and no JVD present. Carotid bruit is not present.   Cardiovascular: Normal rate, regular rhythm and normal heart sounds.    Pulses:       Radial pulses are 2+ on the right side, and 2+ on the left side.        Popliteal pulses are 2+ on the right side, and 2+ on the left side.        Dorsalis pedis pulses are 2+ on the right side, and 2+ on the left side.   No edema BLE.    Pulmonary/Chest: Effort normal and breath sounds normal.   Abdominal: Normal appearance and bowel " sounds are normal.   Neurological: He is alert and oriented to person, place, and time.   Skin: Skin is warm, dry and intact.   Psychiatric: He has a normal mood and affect. His speech is normal and behavior is normal. Judgment and thought content normal. Cognition and memory are normal.       Procedure   Procedures         Assessment/Plan      Diagnosis Plan   1. Coronary artery disease involving native coronary artery of native heart with angina pectoris  technetium labeled red blood cells (ULTRATAG)    isosorbide mononitrate (IMDUR) 30 MG 24 hr tablet    [facility] Cardiac Blood Pool [facility]    [facility] Cardiac Blood Pool [facility]    Ambulatory Referral to Cardiology   2. Hyperlipidemia, unspecified hyperlipidemia type  technetium labeled red blood cells (ULTRATAG)    [facility] Cardiac Blood Pool [facility]    [facility] Cardiac Blood Pool [facility]   3. Essential hypertension  technetium labeled red blood cells (ULTRATAG)    [facility] Cardiac Blood Pool [facility]    atorvastatin (LIPITOR) 40 MG tablet    [facility] Cardiac Blood Pool [facility]   4. Smoking     5. Cardiomyopathy, unspecified type (CMS/HCC)  technetium labeled red blood cells (ULTRATAG)    [facility] Medicine Cardiac Blood Pool [facility]    [facility] Cardiac Blood Pool [facility]   6. Shortness of breath  technetium labeled red blood cells (ULTRATAG)    [facility] Cardiac Blood Pool [facility]    [facility] Medicine Cardiac Blood Pool [facility]   7. Precordial pain  technetium labeled red blood cells (ULTRATAG)    isosorbide mononitrate (IMDUR) 30 MG 24 hr tablet    [facility] Cardiac Blood Pool [facility]    atorvastatin (LIPITOR) 40 MG tablet    [facility] Cardiac Blood Pool [facility]    Ambulatory Referral to Cardiology   8. Dizziness  US Carotid Bilateral    US Carotid Bilateral   9. SOB (shortness of breath)  atorvastatin (LIPITOR) 40 MG tablet     Ambulatory Referral to Cardiology   10. Palpitations  atorvastatin (LIPITOR) 40 MG tablet       Return in about 6 weeks (around 8/13/2018).    CAD/hyperlipidemia/hypertension/cardiomyopathy/shortness of breath/chest pain-patient will have [facility]GA scan to get true EF.  He will also be referred to [facility] for possible  stenting.  He will start Imdur half a tablet for 3 days then go to a full tab.  He will increase his Lipitor to 40 mg nightly.  We will order repeat labs when he follows up in 6 weeks.  Dizziness-patient will carotid artery ultrasound.  He will continue his medication regimen otherwise.  He will use nitroglycerin when necessary for chest pain no resolution he will go to the ER.  Again he will follow-up in 6 weeks or sooner if any changes.      Mel advised of need for [facility] referral.     I advised Josiah of the risks of continuing to use tobacco, and I provided him with tobacco cessation educational materials in the After Visit Summary.     During this visit, I spent <3 minutes counseling the patient regarding tobacco cessation.    Patient's Body mass index is 27.95 kg/m². BMI is above normal parameters. Recommendations include: educational material.      Electronically signed by:

## 2018-07-26 PROBLEM — R06.02 SHORTNESS OF BREATH: Status: ACTIVE | Noted: 2018-01-01

## 2018-07-26 PROBLEM — Z00.00 HEALTHCARE MAINTENANCE: Status: ACTIVE | Noted: 2018-01-01

## 2018-07-26 PROBLEM — I77.9 BILATERAL CAROTID ARTERY DISEASE (HCC): Status: ACTIVE | Noted: 2018-01-01

## 2018-07-26 PROBLEM — R93.89 ABNORMAL CAROTID ULTRASOUND: Status: ACTIVE | Noted: 2018-01-01

## 2018-07-26 PROBLEM — I42.9 CARDIOMYOPATHY (HCC): Status: ACTIVE | Noted: 2018-01-01

## 2018-07-26 NOTE — PATIENT INSTRUCTIONS
Obesity, Adult  Obesity is the condition of having too much total body fat. Being overweight or obese means that your weight is greater than what is considered healthy for your body size. Obesity is determined by a measurement called BMI. BMI is an estimate of body fat and is calculated from height and weight. For adults, a BMI of 30 or higher is considered obese.  Obesity can eventually lead to other health concerns and major illnesses, including:  · Stroke.  · Coronary artery disease (CAD).  · Type 2 diabetes.  · Some types of cancer, including cancers of the colon, breast, uterus, and gallbladder.  · Osteoarthritis.  · High blood pressure (hypertension).  · High cholesterol.  · Sleep apnea.  · Gallbladder stones.  · Infertility problems.    What are the causes?  The main cause of obesity is taking in (consuming) more calories than your body uses for energy. Other factors that contribute to this condition may include:  · Being born with genes that make you more likely to become obese.  · Having a medical condition that causes obesity. These conditions include:  ? Hypothyroidism.  ? Polycystic ovarian syndrome (PCOS).  ? Binge-eating disorder.  ? Cushing syndrome.  · Taking certain medicines, such as steroids, antidepressants, and seizure medicines.  · Not being physically active (sedentary lifestyle).  · Living where there are limited places to exercise safely or buy healthy foods.  · Not getting enough sleep.    What increases the risk?  The following factors may increase your risk of this condition:  · Having a family history of obesity.  · Being a woman of -American descent.  · Being a man of  descent.    What are the signs or symptoms?  Having excessive body fat is the main symptom of this condition.  How is this diagnosed?  This condition may be diagnosed based on:  · Your symptoms.  · Your medical history.  · A physical exam. Your health care provider may measure:  ? Your BMI. If you are an  adult with a BMI between 25 and less than 30, you are considered overweight. If you are an adult with a BMI of 30 or higher, you are considered obese.  ? The distances around your hips and your waist (circumferences). These may be compared to each other to help diagnose your condition.  ? Your skinfold thickness. Your health care provider may gently pinch a fold of your skin and measure it.    How is this treated?  Treatment for this condition often includes changing your lifestyle. Treatment may include some or all of the following:  · Dietary changes. Work with your health care provider and a dietitian to set a weight-loss goal that is healthy and reasonable for you. Dietary changes may include eating:  ? Smaller portions. A portion size is the amount of a particular food that is healthy for you to eat at one time. This varies from person to person.  ? Low-calorie or low-fat options.  ? More whole grains, fruits, and vegetables.  · Regular physical activity. This may include aerobic activity (cardio) and strength training.  · Medicine to help you lose weight. Your health care provider may prescribe medicine if you are unable to lose 1 pound a week after 6 weeks of eating more healthily and doing more physical activity.  · Surgery. Surgical options may include gastric banding and gastric bypass. Surgery may be done if:  ? Other treatments have not helped to improve your condition.  ? You have a BMI of 40 or higher.  ? You have life-threatening health problems related to obesity.    Follow these instructions at home:    Eating and drinking    · Follow recommendations from your health care provider about what you eat and drink. Your health care provider may advise you to:  ? Limit fast foods, sweets, and processed snack foods.  ? Choose low-fat options, such as low-fat milk instead of whole milk.  ? Eat 5 or more servings of fruits or vegetables every day.  ? Eat at home more often. This gives you more control over  what you eat.  ? Choose healthy foods when you eat out.  ? Learn what a healthy portion size is.  ? Keep low-fat snacks on hand.  ? Avoid sugary drinks, such as soda, fruit juice, iced tea sweetened with sugar, and flavored milk.  ? Eat a healthy breakfast.  · Drink enough water to keep your urine clear or pale yellow.  · Do not go without eating for long periods of time (do not fast) or follow a fad diet. Fasting and fad diets can be unhealthy and even dangerous.  Physical Activity  · Exercise regularly, as told by your health care provider. Ask your health care provider what types of exercise are safe for you and how often you should exercise.  · Warm up and stretch before being active.  · Cool down and stretch after being active.  · Rest between periods of activity.  Lifestyle  · Limit the time that you spend in front of your TV, computer, or video game system.  · Find ways to reward yourself that do not involve food.  · Limit alcohol intake to no more than 1 drink a day for nonpregnant women and 2 drinks a day for men. One drink equals 12 oz of beer, 5 oz of wine, or 1½ oz of hard liquor.  General instructions  · Keep a weight loss journal to keep track of the food you eat and how much you exercise you get.  · Take over-the-counter and prescription medicines only as told by your health care provider.  · Take vitamins and supplements only as told by your health care provider.  · Consider joining a support group. Your health care provider may be able to recommend a support group.  · Keep all follow-up visits as told by your health care provider. This is important.  Contact a health care provider if:  · You are unable to meet your weight loss goal after 6 weeks of dietary and lifestyle changes.  This information is not intended to replace advice given to you by your health care provider. Make sure you discuss any questions you have with your health care provider.  Document Released: 01/25/2006 Document Revised:  05/22/2017 Document Reviewed: 10/05/2016  [employer] Interactive Patient Education © 2018 [employer]  MyPlate from [facility]  The general, healthful diet is based on the 2010 Dietary Guidelines for Americans. The amount of food you need to eat from each food group depends on your age, sex, and level of physical activity and can be individualized by a dietitian. Go to ChooseMyPlate.gov for more information.  What do I need to know about the MyPlate plan?  · Enjoy your food, but eat less.  · Avoid oversized portions.  ? ½ of your plate should include fruits and vegetables.  ? ¼ of your plate should be grains.  ? ¼ of your plate should be protein.  Grains  · Make at least half of your grains whole grains.  · For a 2,000 calorie daily food plan, eat 6 oz every day.  · 1 oz is about 1 slice bread, 1 cup cereal, or ½ cup cooked rice, cereal, or pasta.  Vegetables  · Make half your plate fruits and vegetables.  · For a 2,000 calorie daily food plan, eat 2½ cups every day.  · 1 cup is about 1 cup raw or cooked vegetables or vegetable juice or 2 cups raw leafy greens.  Fruits  · Make half your plate fruits and vegetables.  · For a 2,000 calorie daily food plan, eat 2 cups every day.  · 1 cup is about 1 cup fruit or 100% fruit juice or ½ cup dried fruit.  Protein  · For a 2,000 calorie daily food plan, eat 5½ oz every day.  · 1 oz is about 1 oz meat, poultry, or fish, ¼ cup cooked beans, 1 egg, 1 Tbsp peanut butter, or ½ oz nuts or seeds.  Dairy  · Switch to fat-free or low-fat (1%) milk.  · For a 2,000 calorie daily food plan, eat 3 cups every day.  · 1 cup is about 1 cup milk or yogurt or soy milk (soy beverage), 1½ oz natural cheese, or 2 oz processed cheese.  Fats, Oils, and Empty Calories  · Only small amounts of oils are recommended.  · Empty calories are calories from solid fats or added sugars.  · Compare sodium in foods like soup, bread, and frozen meals. Choose the foods with lower numbers.  · Drink water instead of  sugary drinks.  What foods can I eat?  Grains  Whole grains such as whole wheat, quinoa, millet, and bulgur. Bread, rolls, and pasta made from whole grains. Brown or wild rice. Hot or cold cereals made from whole grains and without added sugar.  Vegetables  All fresh vegetables, especially fresh red, dark green, or orange vegetables. Peas and beans. Low-sodium frozen or canned vegetables prepared without added salt. Low-sodium vegetable juices.  Fruits  All fresh, frozen, and dried fruits. Canned fruit packed in water or fruit juice without added sugar. Fruit juices without added sugar.  Meats and Other Protein Sources  Boiled, baked, or grilled lean meat trimmed of fat. Skinless poultry. Fresh seafood and shellfish. Canned seafood packed in water. Unsalted nuts and unsalted nut butters. Tofu. Dried beans and pea. Eggs.  Dairy  Low-fat or fat-free milk, yogurt, and cheeses.  Sweets and Desserts  Frozen desserts made from low-fat milk.  Fats and Oils  Olive, peanut, and canola oils and margarine. Salad dressing and mayonnaise made from these oils.  Other  Soups and casseroles made from allowed ingredients and without added fat or salt.  The items listed above may not be a complete list of recommended foods or beverages. Contact your dietitian for more options.  What foods are not recommended?  Grains  Sweetened, low-fiber cereals. Packaged baked goods. Snack crackers and chips. Cheese crackers, butter crackers, and biscuits. Frozen waffles, sweet breads, doughnuts, pastries, packaged baking mixes, pancakes, cakes, and cookies.  Vegetables  Regular canned or frozen vegetables or vegetables prepared with salt. Canned tomatoes. Canned tomato sauce. Fried vegetables. Vegetables in cream sauce or cheese sauce.  Fruits  Fruits packed in syrup or made with added sugar.  Meats and Other Protein Sources  Marbled or fatty meats such as ribs. Poultry with skin. Fried meats, poultry, eggs, or fish. Sausages, hot dogs, and deli  meats such as pastrami, bologna, or salami.  Dairy  Whole milk, cream, cheeses made from whole milk, sour cream. Ice cream or yogurt made from whole milk or with added sugar.  Beverages  For adults, no more than one alcoholic drink per day. Regular soft drinks or other sugary beverages. Juice drinks.  Sweets and Desserts  Sugary or fatty desserts, candy, and other sweets.  Fats and Oils  Solid shortening or partially hydrogenated oils. Solid margarine. Margarine that contains trans fats. Butter.  The items listed above may not be a complete list of foods and beverages to avoid. Contact your dietitian for more information.  This information is not intended to replace advice given to you by your health care provider. Make sure you discuss any questions you have with your health care provider.  Document Released: 01/06/2009 Document Revised: 05/25/2017 Document Reviewed: 11/26/2014  [employer] Interactive Patient Education © 2018 [employer]  For more information:    Quit Now [place]  1-800-QUIT-NOW  https://[place].quitlogix.org/en-[place]/  Steps to Quit Smoking  Smoking tobacco can be harmful to your health and can affect almost every organ in your body. Smoking puts you, and those around you, at risk for developing many serious chronic diseases. Quitting smoking is difficult, but it is one of the best things that you can do for your health. It is never too late to quit.  What are the benefits of quitting smoking?  When you quit smoking, you lower your risk of developing serious diseases and conditions, such as:  · Lung cancer or lung disease, such as COPD.  · Heart disease.  · Stroke.  · Heart attack.  · Infertility.  · Osteoporosis and bone fractures.  Additionally, symptoms such as coughing, wheezing, and shortness of breath may get better when you quit. You may also find that you get sick less often because your body is stronger at fighting off colds and infections. If you are pregnant, quitting smoking can help to  reduce your chances of having a baby of low birth weight.  How do I get ready to quit?  When you decide to quit smoking, create a plan to make sure that you are successful. Before you quit:  · Pick a date to quit. Set a date within the next two weeks to give you time to prepare.  · Write down the reasons why you are quitting. Keep this list in places where you will see it often, such as on your bathroom mirror or in your car or wallet.  · Identify the people, places, things, and activities that make you want to smoke (triggers) and avoid them. Make sure to take these actions:  ¨ Throw away all cigarettes at home, at work, and in your car.  ¨ Throw away smoking accessories, such as ashtrays and lighters.  ¨ Clean your car and make sure to empty the ashtray.  ¨ Clean your home, including curtains and carpets.  · Tell your family, friends, and coworkers that you are quitting. Support from your loved ones can make quitting easier.  · Talk with your health care provider about your options for quitting smoking.  · Find out what treatment options are covered by your health insurance.  What strategies can I use to quit smoking?  Talk with your healthcare provider about different strategies to quit smoking. Some strategies include:  · Quitting smoking altogether instead of gradually lessening how much you smoke over a period of time. Research shows that quitting “cold turkey” is more successful than gradually quitting.  · Attending in-person counseling to help you build problem-solving skills. You are more likely to have success in quitting if you attend several counseling sessions. Even short sessions of 10 minutes can be effective.  · Finding resources and support systems that can help you to quit smoking and remain smoke-free after you quit. These resources are most helpful when you use them often. They can include:  ¨ Online chats with a counselor.  ¨ Telephone quitlines.  ¨ Printed self-help materials.  ¨ Support groups  or group counseling.  ¨ Text messaging programs.  ¨ Mobile phone applications.  · Taking medicines to help you quit smoking. (If you are pregnant or breastfeeding, talk with your health care provider first.) Some medicines contain nicotine and some do not. Both types of medicines help with cravings, but the medicines that include nicotine help to relieve withdrawal symptoms. Your health care provider may recommend:  ¨ Nicotine patches, gum, or lozenges.  ¨ Nicotine inhalers or sprays.  ¨ Non-nicotine medicine that is taken by mouth.  Talk with your health care provider about combining strategies, such as taking medicines while you are also receiving in-person counseling. Using these two strategies together makes you more likely to succeed in quitting than if you used either strategy on its own.  If you are pregnant or breastfeeding, talk with your health care provider about finding counseling or other support strategies to quit smoking. Do not take medicine to help you quit smoking unless told to do so by your health care provider.  What things can I do to make it easier to quit?  Quitting smoking might feel overwhelming at first, but there is a lot that you can do to make it easier. Take these important actions:  · Reach out to your family and friends and ask that they support and encourage you during this time. Call telephone quitlines, reach out to support groups, or work with a counselor for support.  · Ask people who smoke to avoid smoking around you.  · Avoid places that trigger you to smoke, such as bars, parties, or smoke-break areas at work.  · Spend time around people who do not smoke.  · Lessen stress in your life, because stress can be a smoking trigger for some people. To lessen stress, try:  ¨ Exercising regularly.  ¨ Deep-breathing exercises.  ¨ Yoga.  ¨ Meditating.  ¨ Performing a body scan. This involves closing your eyes, scanning your body from head to toe, and noticing which parts of your body  are particularly tense. Purposefully relax the muscles in those areas.  · Download or purchase mobile phone or tablet apps (applications) that can help you stick to your quit plan by providing reminders, tips, and encouragement. There are many free apps, such as QuitGuide from the CDC (Centers for Disease Control and Prevention). You can find other support for quitting smoking (smoking cessation) through smokefree.gov and other websites.  How will I feel when I quit smoking?  Within the first 24 hours of quitting smoking, you may start to feel some withdrawal symptoms. These symptoms are usually most noticeable 2-3 days after quitting, but they usually do not last beyond 2-3 weeks. Changes or symptoms that you might experience include:  · Mood swings.  · Restlessness, anxiety, or irritation.  · Difficulty concentrating.  · Dizziness.  · Strong cravings for sugary foods in addition to nicotine.  · Mild weight gain.  · Constipation.  · Nausea.  · Coughing or a sore throat.  · Changes in how your medicines work in your body.  · A depressed mood.  · Difficulty sleeping (insomnia).  After the first 2-3 weeks of quitting, you may start to notice more positive results, such as:  · Improved sense of smell and taste.  · Decreased coughing and sore throat.  · Slower heart rate.  · Lower blood pressure.  · Clearer skin.  · The ability to breathe more easily.  · Fewer sick days.  Quitting smoking is very challenging for most people. Do not get discouraged if you are not successful the first time. Some people need to make many attempts to quit before they achieve long-term success. Do your best to stick to your quit plan, and talk with your health care provider if you have any questions or concerns.  This information is not intended to replace advice given to you by your health care provider. Make sure you discuss any questions you have with your health care provider.  Document Released: 12/12/2002 Document Revised: 08/15/2017  Document Reviewed: 05/03/2016  [employer] Interactive Patient Education © 2017 [employer]    Cardiomyopathy, Adult  Cardiomyopathy is a long-term (chronic) disease of the heart muscle. The disease makes the heart muscle thick, weak, or stiff. As a result, the heart works harder to pump blood. Over time, cardiomyopathy can lead to heart failure.  There are several types of cardiomyopathy:  · Dilated cardiomyopathy. This type causes the ventricles to become weak and stretched out.  · Hypertrophic cardiomyopathy. This type causes the heart muscle to thicken.  · Restrictive cardiomyopathy. This type causes the heart muscle to become stiff.  · Ischemic cardiomyopathy. This type involves narrowing arteries that cause the walls of the heart to get thinner.  · Peripartum cardiomyopathy. This type occurs during pregnancy or shortly after pregnancy.    What are the causes?  This condition may be caused by:  · A gene that is passed down (inherited) from a family member.  · A medical condition that damages the heart, such as:  ? Diabetes.  ? High blood pressure.  ? Viral infection of the heart.  ? Heart attack.  ? Coronary heart disease.  · Alcoholism.  · Using illegal drugs or some prescription medicines.  · Pregnancy.  · Your body absorbing and storing too much iron (hemochromatosis).  · Autoimmune diseases, connective tissue diseases, endocrine diseases, and muscle diseases.  · Cancer treatments.  · Buildup of proteins in your organs (amyloidosis), or inflammation in your organs (sarcoidosis).    Often, the cause is not known.  What increases the risk?  This condition is more likely to develop in people who:  · Have a family history of cardiomyopathy or other heart problems.  · Are overweight or obese.  · Use illegal drugs.  · Abuse alcohol.  · Have a medical condition that damages the heart.    What are the signs or symptoms?  Symptoms of this condition include:  · Shortness of breath, especially during  activity.  · Fatigue.  · An irregular heartbeat and heart murmurs.  · Dizziness.  · Light-headedness.  · Fainting.  · Chest pain.  · Coughing.  · Swelling in the lower legs, ankles, feet, abdomen, and neck veins.    Often, people with this condition have no symptoms.  How is this diagnosed?  This condition is diagnosed based on:  · Your symptoms and medical history.  · A physical exam.  · Tests.    Tests may include:  · Blood tests.  · Imaging studies of your heart, such as:  ? X-rays.  ? An echocardiogram.  ? An MRI.  · An electrocardiogram (ECG). This records your heart's electrical activity.  · A test in which you wear a portable device (event monitor) to record your heart's electrical activity while you go about your day.  · A stress test. This monitors your heart's activity while exercising.  · Cardiac catheterization. This procedure checks the blood pressure and blood flow in your heart.  · An angiogram. This is an injection of dye into your arteries before imaging studies are taken.  · Heart tissue biopsy. This removes a sample of heart tissue for examination.    How is this treated?    Treatment for this condition depends on the type of cardiomyopathy you have and the severity of your symptoms. If you do not have symptoms, you may not need treatment. If you need treatment, it may include:  · Lifestyle changes, such as:  ? Eating a heart-healthy diet that includes plenty of fruits, vegetables, and whole grains, and cutting down on salt (sodium).  ? Maintaining a healthy weight, and losing weight, if needed.  ? Getting regular exercise.  ? Quitting smoking, if you smoke.  ? Avoiding alcohol.  ? Medicine to:  § Lower your blood pressure.  § Slow down your heart rate.  § Keep your heart beating in a steady rhythm.  § Clear excess fluids from your body.  § Prevent blood clots.  § Balance minerals (electrolytes) in your body and get rid of extra sodium in your body.  § Reduce inflammation.  § Strengthen your  heartbeat.  ? Surgery to:  § Repair a defect.  § Remove thickened tissue.  § Destroy tissues in the area of abnormal electrical activity (ablation).  § Implant a device to treat serious heart rhythm problems (implantable cardioverter-defibrillator, or ICD), or a pacemaker.  § Replace your heart (heart transplant) if all other treatments have failed (end stage).    Other treatments may include cardiac resynchronization therapy (CRT) or a left ventricular assist device (LVAD).  Follow these instructions at home:  Lifestyle  · Eat a heart-healthy diet. Work with your health care provider or a registered dietitian to learn about healthy eating options.  · Maintain a healthy weight.  · Stay physically active. Ask your health care provider to suggest some activities that are good for you.  · Do not use any products that contain nicotine or tobacco, such as cigarettes and e-cigarettes. If you need help quitting, ask your health care provider.  · Limit alcohol intake to no more than one drink per day for nonpregnant women and no more than two drinks per day for men. One drink equals 12 oz of beer, 5 oz of wine, or 1½ oz of hard liquor.  · Try to get at least 7 hours of sleep each night.  · Find healthy ways to manage stress.  General instructions  · Take over-the-counter and prescription medicines only as told by your health care provider. Some medicines can be dangerous for your heart.  · Tell all health care providers, including your dentist, that you have cardiomyopathy. When you visit the dentist or have surgery, ask your health care provider if you need antibiotics before having dental care or before the surgery.  · Ask your health care provider if you should wear a medical identification bracelet. This may be important if you have a pacemaker or a defibrillator.  · Make sure you get all recommended vaccinations and an annual flu shot.  · Work closely with your health care provider to manage any long-lasting (chronic)  conditions.  · Keep all follow-up visits as told by your health care provider. This is important, even if you do not have any symptoms. Your health care provider may need to make sure your condition is not getting worse.  How is this prevented?  This condition cannot be prevented. Parents, siblings, and children of people with this condition may be at risk for the condition. It is a good idea for them to get screened for the condition because it is best when cardiomyopathy is found early. Screening is done with an ECG and echocardiogram.  People who want to start a family may also want to meet with a genetic counselor to discuss the risk of having a child with cardiomyopathy.  Contact a health care provider if:  · Your symptoms get worse.  · You have new symptoms.  Get help right away if:  · You have severe chest pain.  · You have shortness of breath.  · You cough up pink, bubbly material.  · You have sudden sweating.  · You feel nauseous and you vomit.  · You suddenly become light-headed or dizzy.  · You feel your heart beating very quickly.  · It feels like your heart is skipping beats.  These symptoms may represent a serious problem that is an emergency. Do not wait to see if the symptoms will go away. Get medical help right away. Call your local emergency services (911 in the [place] Do not drive yourself to the hospital.  This information is not intended to replace advice given to you by your health care provider. Make sure you discuss any questions you have with your health care provider.  Document Released: 03/02/2006 Document Revised: 08/15/2017 Document Reviewed: 06/19/2017  [employer] Interactive Patient Education © 2018 [employer]

## 2018-07-26 NOTE — PROGRESS NOTES
Subjective   Josiah Orta Jr. is a 58 y.o. male     Chief Complaint   Patient presents with   • Coronary Artery Disease     presents as a follow up   • Hypertension   • Hyperlipidemia       HPI    Problem list:    1.  CAD  1.1 left heart catheter 6/27/18-long total occlusion of the circumflex, ongoing circumflex at least 50% narrowing before providing a small nests the posterior lateral marginal branches with collaterals to the distal right coronary artery, proximal and midportion of the LAD 30-50% and 20-40% stenosis in the proximal and mid vessel and 50% or greater stenosis at the origins of the second and third diagonals, EF 30-35%, left ventricular end-diastolic pressure 28-30; referred to [facility] for possible  if not then may need CABG.  2.  Hypertension  3.  Dyslipidemia  4.  Shortness of breath  5.  Smoking habituation  6.  Carotid artery disease  6.1 carotid artery ultrasound 7/23/18-both common carotids patent, diffuse densely plaque in the bulb and bifurcation on the right resulting in 50-75% stenosis in the bulb and proximal right external carotid artery.  16-49% stenosis maximum in both internal carotid arteries, antegrade flow demonstrated both for tumor arteries probable moderate but potentially hemodynamically significant stenosis in the bulb and bifurcation on the right  7.  Ischemic cardiomyopathy  7.1 MUGA Scan 7/23/18 - EF 6%    Patient is a 58-year-old male who presents today for a follow-up with his daughter at his side.  He says he's not had any further chest pain or pressure since being on Imdur.  He denies any palpitations, fluttering, dizziness, presyncope, syncope, orthopnea or PND.  He says that he will get some swelling in his legs but they've been at good for a while now.  He says that he does get short of breath pretty much with any activity and sometimes he feels like he is breathing fast whenever he is resting.  Patient just started a patch today.  But he has been still  smoking about a pack a day.    Patient went to [facility] and they indicated that they did not feel that it was appropriate to stent the  at this time.    We went over carotid artery ultrasound in [facility] scan.    Current Outpatient Prescriptions   Medication Sig Dispense Refill   • aspirin 81 MG tablet Take 1 tablet by mouth Daily. 30 tablet 11   • atorvastatin (LIPITOR) 40 MG tablet Take 1 tablet by mouth Every Night. 30 tablet 11   • clopidogrel (PLAVIX) 75 MG tablet Take 1 tablet by mouth Daily. 30 tablet 11   • isosorbide mononitrate (IMDUR) 30 MG 24 hr tablet Take 1/2 tablet by mouth nightly for three days then increase to one tablet nightly (Patient taking differently: Take 30 mg by mouth Daily.) 30 tablet 11   • metoprolol tartrate (LOPRESSOR) 50 MG tablet Take 50 mg by mouth 2 (Two) Times a Day.     • nitroglycerin (NITROSTAT) 0.4 MG SL tablet 1 under the tongue as needed for angina, may repeat q5mins for up three doses 25 tablet 3   • nicotine (NICOTINE STEP 1) 21 MG/24HR patch Place 1 patch on the skin as directed by provider Daily. 30 patch 0   • sacubitril-valsartan (ENTRESTO) 24-26 MG tablet Take 1 tablet by mouth Every 12 (Twelve) Hours. 60 tablet 5     No current facility-administered medications for this visit.        ALLERGIES    Patient has no known allergies.    Past Medical History:   Diagnosis Date   • Hypertension    • Myocardial infarction        Social History     Social History   • Marital status:      Spouse name: N/A   • Number of children: N/A   • Years of education: N/A     Occupational History   • Not on file.     Social History Main Topics   • Smoking status: Current Every Day Smoker     Packs/day: 1.50     Years: 45.00     Types: Cigarettes   • Smokeless tobacco: Never Used      Comment: patient is now on nicotine patches    • Alcohol use No   • Drug use: No   • Sexual activity: Defer     Other Topics Concern   • Not on file     Social History Narrative   • No narrative on  "file       Family History   Problem Relation Age of Onset   • COPD Mother    • Heart disease Mother    • Hypertension Father    • Myasthenia gravis Father        Review of Systems   Constitutional: Positive for fatigue. Negative for diaphoresis.   HENT: Positive for rhinorrhea and sneezing.    Eyes: Positive for visual disturbance ( wears glasses ).   Respiratory: Positive for shortness of breath (any activity and some at rest will breath fast sometimes ). Negative for chest tightness.    Cardiovascular: Positive for leg swelling (has had right leg swelling. has been good for a little will). Negative for chest pain and palpitations.   Gastrointestinal: Positive for diarrhea. Negative for constipation, nausea and vomiting.   Endocrine: Negative.    Genitourinary: Negative for difficulty urinating.   Musculoskeletal: Positive for arthralgias, myalgias and neck pain (not sleeping good). Negative for back pain.   Skin: Positive for wound (RFA; right knee; left abd; attacked by 2 dogs ).   Allergic/Immunologic: Positive for environmental allergies (seasonal allergies/sinus issues ). Negative for food allergies.   Neurological: Negative for dizziness, syncope and light-headedness.   Hematological: Does not bruise/bleed easily.   Psychiatric/Behavioral: Positive for sleep disturbance. The patient is nervous/anxious.        Objective   /76 (BP Location: Left arm, Patient Position: Sitting)   Pulse 87   Ht 172.7 cm (68\")   Wt 84.4 kg (186 lb)   SpO2 97%   BMI 28.28 kg/m²   Vitals:    07/26/18 1535   BP: 121/76   BP Location: Left arm   Patient Position: Sitting   Pulse: 87   SpO2: 97%   Weight: 84.4 kg (186 lb)   Height: 172.7 cm (68\")      Lab Results (most recent)     None        Physical Exam   Constitutional: He is oriented to person, place, and time. Vital signs are normal. He appears well-developed and well-nourished. He is active and cooperative.   HENT:   Head: Normocephalic.   Mouth/Throat: Abnormal " dentition (poor).   Eyes: Lids are normal.   Neck: Normal carotid pulses, no hepatojugular reflux and no JVD present. Carotid bruit is present (soft).   Cardiovascular: Normal rate, regular rhythm and normal heart sounds.    Pulses:       Radial pulses are 2+ on the right side, and 2+ on the left side.        Dorsalis pedis pulses are 2+ on the right side, and 2+ on the left side.        Posterior tibial pulses are 2+ on the right side, and 2+ on the left side.   No edema BLE.    Pulmonary/Chest: Effort normal. He has decreased breath sounds in the right lower field and the left lower field.   Abdominal: Normal appearance and bowel sounds are normal.   Neurological: He is alert and oriented to person, place, and time.   Skin: Skin is warm and dry.   Abrasions on RFA, Right knee and left abdomen with erythema and contusions.    Psychiatric: He has a normal mood and affect. His speech is normal and behavior is normal. Judgment and thought content normal. Cognition and memory are normal.       Procedure   Procedures         Assessment/Plan      Diagnosis Plan   1. Coronary artery disease involving native coronary artery of native heart with angina pectoris (CMS/HCC)  Magnesium   2. Essential hypertension  Basic Metabolic Panel    Magnesium   3. Hyperlipidemia, unspecified hyperlipidemia type     4. Smoking     5. Cardiomyopathy, unspecified type (CMS/HCC)  sacubitril-valsartan (ENTRESTO) 24-26 MG tablet    Magnesium   6. Shortness of breath  sacubitril-valsartan (ENTRESTO) 24-26 MG tablet   7. Healthcare maintenance  Basic Metabolic Panel    Magnesium   8. Bilateral carotid artery disease (CMS/HCC)  CT angiogram carotids    CT angiogram carotids   9. Abnormal carotid ultrasound  CT angiogram carotids    CT angiogram carotids       Return in about 6 weeks (around 9/6/2018).     CAD-patient is on aspirin, statin and beta.  Hypertension-patient doing well.  Hyperlipidemia-patient is on Lipitor.  Smoking-patient is still  smoking a pack a day however he did start using the nicotine patches today.  Cardiomyopathy/shortness of breath-patient will start Entresto.  We will get him fitted for a LifeVest as well.  He will get a BMP and magnesium today.  Bilateral carotid artery disease/abnormal carotid artery ultrasound-patient will have CT angiogram of the carotids.  He will continue his medication regimen otherwise for now.  He will monitor his weight daily.  He will follow-up in 6 weeks or sooner if any changes.         I advised Josaih of the risks of continuing to use tobacco, and I provided him with tobacco cessation educational materials in the After Visit Summary.     During this visit, I spent <3 minutes counseling the patient regarding tobacco cessation.    Patient's Body mass index is 28.28 kg/m². BMI is above normal parameters. Recommendations include: educational material.      Electronically signed by:

## 2018-07-27 NOTE — TELEPHONE ENCOUNTER
Called patient's daughter as I received records from [facility] and they apparently started him on Lisinopril, which patient failed to advise me of yesterday.  She will let her dad know he needs to D/C the Lisinopril, which he has only had for 2 days.      I called the pharmacy and spoke with Dm and advised to not fill Lisinopril anymore as patient was switched to Entresto.

## 2018-07-31 NOTE — TELEPHONE ENCOUNTER
A PA has been started and awaiting to hear back from insurance.       ----- Message from Cony Domínguez LPN sent at 7/31/2018  4:47 PM EDT -----  I spoke with patient's daughter re: Entresto PA. She would like a call once PA is approved.   ----- Message -----  From: Clarita Staples MA  Sent: 7/31/2018   4:34 PM  To: Cony Domínguez LPN    Patient's daughter called wanting to know if Lisa can prescribe step 1 of the nicotine patch.     She also called stating that the pharmacy told her Entresto will need a PA every refill. I advised the patient we do have samples here in the office when they need them.     Patient would like a call back if we can prescribe the patches for him. Call back is [phone]

## 2018-09-18 NOTE — TELEPHONE ENCOUNTER
----- Message from ASHLEY Campbell sent at 8/29/2018  8:04 AM EDT -----  Refer to either Marcia or Charlene or Dr. Starkey. THanks    Numerous attempts made to reach patient by phone unsuccessful. Called patient's daughter, Teresa and she is aware of results. She requests that referral be made to Dr. Starkey at this time. She also requests that she be notifies with appointment date and time.

## 2018-10-01 PROBLEM — I65.23 BILATERAL CAROTID ARTERY STENOSIS: Status: ACTIVE | Noted: 2018-01-01

## 2018-10-01 NOTE — PROGRESS NOTES
10/01/2018  Patient Information  Josiah Orta Jr.                                                                                          [address] [place] [id number]   1960  'PCP/Referring Physician'  [place] MD  [phone]  No ref. provider found    Chief Complaint   Patient presents with   • Carotid Artery Disease     Referred by TRINIDAD Rich for carotid stenosis       History of Present Illness:   This patient was referred to me to evaluate carotid artery stenosis.  The patient himself has a heavy smoking history and has known underlying coronary artery disease.  He denies stroke, seizure, TIAs, amaurosis fugax, dysarthria, or dysphagia.  He denies any problems with recent memory loss.  CT angiogram has been performed at the [facility] and I have obtained those results.  He comes in for me to discuss those with the patient and his family today.  Currently he also has no significant shortness of breath or angina.      Patient Active Problem List   Diagnosis   • Essential hypertension   • Hyperlipidemia   • Coronary artery disease involving native coronary artery of native heart with angina pectoris (CMS/HCC)   • Smoking   • Abnormal carotid ultrasound   • Bilateral carotid artery disease (CMS/HCC)   • Healthcare maintenance   • Shortness of breath   • Cardiomyopathy (CMS/HCC)   • Bilateral carotid artery stenosis     Past Medical History:   Diagnosis Date   • Borderline diabetes    • GERD (gastroesophageal reflux disease)    • Hyperlipidemia    • Hypertension    • Myocardial infarction (CMS/HCC)      History reviewed. No pertinent surgical history.    Current Outpatient Prescriptions:   •  aspirin 81 MG tablet, Take 1 tablet by mouth Daily., Disp: 30 tablet, Rfl: 11  •  atorvastatin (LIPITOR) 40 MG tablet, Take 1 tablet by mouth Every Night., Disp: 30 tablet, Rfl: 11  •  clopidogrel (PLAVIX) 75 MG tablet, Take 1 tablet by mouth Daily., Disp: 30 tablet, Rfl:  11  •  isosorbide mononitrate (IMDUR) 30 MG 24 hr tablet, Take 1/2 tablet by mouth nightly for three days then increase to one tablet nightly (Patient taking differently: Take 30 mg by mouth Daily.), Disp: 30 tablet, Rfl: 11  •  metoprolol tartrate (LOPRESSOR) 50 MG tablet, Take 50 mg by mouth 2 (Two) Times a Day., Disp: , Rfl:   •  nicotine (NICOTINE STEP 1) 21 MG/24HR patch, Place 1 patch on the skin as directed by provider Daily., Disp: 30 patch, Rfl: 0  •  nitroglycerin (NITROSTAT) 0.4 MG SL tablet, 1 under the tongue as needed for angina, may repeat q5mins for up three doses, Disp: 25 tablet, Rfl: 3  •  sacubitril-valsartan (ENTRESTO) 24-26 MG tablet, Take 1 tablet by mouth Every 12 (Twelve) Hours., Disp: 60 tablet, Rfl: 5  No Known Allergies  Social History     Social History   • Marital status:      Spouse name: N/A   • Number of children: 2   • Years of education: N/A     Occupational History   •  Unemployed     Social History Main Topics   • Smoking status: Current Every Day Smoker     Packs/day: 1.50     Years: 45.00     Types: Cigarettes   • Smokeless tobacco: Never Used      Comment: patient is now on nicotine patches    • Alcohol use No   • Drug use: No   • Sexual activity: Defer     Other Topics Concern   • Not on file     Social History Narrative    Lives in [place], [place] with significant other     Family History   Problem Relation Age of Onset   • COPD Mother    • Heart disease Mother    • Hypertension Father    • Myasthenia gravis Father      Review of Systems   Constitution: Positive for malaise/fatigue. Negative for chills, fever, night sweats and weight loss.   HENT: Negative for hearing loss, odynophagia and sore throat.    Cardiovascular: Negative for chest pain, dyspnea on exertion, leg swelling, orthopnea and palpitations.   Respiratory: Positive for cough and shortness of breath. Negative for hemoptysis.    Endocrine: Negative for cold intolerance, heat intolerance, polydipsia,  "polyphagia and polyuria.   Hematologic/Lymphatic: Bruises/bleeds easily.   Skin: Negative for itching and rash.   Musculoskeletal: Positive for muscle cramps. Negative for joint pain, joint swelling and myalgias.   Gastrointestinal: Negative for abdominal pain, constipation, diarrhea, hematemesis, hematochezia, melena, nausea and vomiting.   Genitourinary: Negative for dysuria, frequency and hematuria.   Neurological: Positive for dizziness, headaches (daily- left temple) and loss of balance. Negative for focal weakness, numbness and seizures.   Psychiatric/Behavioral: Negative for depression and suicidal ideas. The patient is not nervous/anxious.    Allergic/Immunologic: Positive for environmental allergies.   All other systems reviewed and are negative.    Vitals:    10/01/18 0746 10/01/18 0747   BP: 146/74 141/91   BP Location: Right arm Left arm   Patient Position: Sitting Sitting   Pulse: 75    Temp: 97.5 °F (36.4 °C)    SpO2: 98%    Weight: 83.8 kg (184 lb 12.8 oz)    Height: 172.7 cm (68\")       Physical Exam   CONSTITUTIONAL: Alert and conversant, Well dressed, Well nourished, No acute distress  EYES: Sclera clean, Anicteric, Pupils equal  ENT: No nasal deviation, Trachea midline  NECK: No neck masses, Supple  LUNGS: No wheezing, Cough, non-congested  HEART: No rubs, No murmurs  GASTROINTESTINAL: Soft, non-distended, No masses, Non tender  to palpation, normal bowel sounds  NEURO: No motor deficits, No sensory deficits, Cranial Nerves 2 through 12 grossly intact  PSYCHIATRIC: Oriented to person, place and time, No memory deficits, Mood appropriate  VASCULAR: No carotid bruits, Femoral pulses palpable and symmetric  MUSKULOSKELETAL: No extremity trauma or extremity asymmetry    Labs/Imaging:   I obtained and reviewed the CT angiogram demonstrating less than 50% internal carotid stenosis bilaterally.    Assessment/Plan:   This patient was referred to me to evaluate carotid artery stenosis.  He had a CT " angiogram which demonstrates bilateral 50% or less internal carotid artery stenosis.  These lesions would be considered asymptomatic at this time and no surgical or catheter-based intervention is required.  He does have a 70% narrowing in the external carotid artery; however, this also requires no intervention.  At this point, I have recommended that he completely discontinue smoking, continue with his aspirin and statin therapy and I have made arrangements for a repeat carotid duplex scan to be done in one year to [facility] and I will follow-up on this for surveillance.  The patient and his daughter understand this plan and are agreeable to proceed.        Patient Active Problem List   Diagnosis   • Essential hypertension   • Hyperlipidemia   • Coronary artery disease involving native coronary artery of native heart with angina pectoris (CMS/HCC)   • Smoking   • Abnormal carotid ultrasound   • Bilateral carotid artery disease (CMS/HCC)   • Healthcare maintenance   • Shortness of breath   • Cardiomyopathy (CMS/HCC)   • Bilateral carotid artery stenosis     CC: ASHLEY iRch MD Debbie Moore, , editing for Zeus Starkey M.D.    I, Zeus Starkey MD, have read and agree with the editing done by Rose Mary Thrasher, .

## 2018-10-02 NOTE — PATIENT INSTRUCTIONS
Steps to Quit Smoking  Smoking tobacco can be bad for your health. It can also affect almost every organ in your body. Smoking puts you and people around you at risk for many serious long-lasting (chronic) diseases. Quitting smoking is hard, but it is one of the best things that you can do for your health. It is never too late to quit.  What are the benefits of quitting smoking?  When you quit smoking, you lower your risk for getting serious diseases and conditions. They can include:  · Lung cancer or lung disease.  · Heart disease.  · Stroke.  · Heart attack.  · Not being able to have children (infertility).  · Weak bones (osteoporosis) and broken bones (fractures).    If you have coughing, wheezing, and shortness of breath, those symptoms may get better when you quit. You may also get sick less often. If you are pregnant, quitting smoking can help to lower your chances of having a baby of low birth weight.  What can I do to help me quit smoking?  Talk with your doctor about what can help you quit smoking. Some things you can do (strategies) include:  · Quitting smoking totally, instead of slowly cutting back how much you smoke over a period of time.  · Going to in-person counseling. You are more likely to quit if you go to many counseling sessions.  · Using resources and support systems, such as:  ? Online chats with a counselor.  ? Phone quitlines.  ? Printed self-help materials.  ? Support groups or group counseling.  ? Text messaging programs.  ? Mobile phone apps or applications.  · Taking medicines. Some of these medicines may have nicotine in them. If you are pregnant or breastfeeding, do not take any medicines to quit smoking unless your doctor says it is okay. Talk with your doctor about counseling or other things that can help you.    Talk with your doctor about using more than one strategy at the same time, such as taking medicines while you are also going to in-person counseling. This can help make  quitting easier.  What things can I do to make it easier to quit?  Quitting smoking might feel very hard at first, but there is a lot that you can do to make it easier. Take these steps:  · Talk to your family and friends. Ask them to support and encourage you.  · Call phone quitlines, reach out to support groups, or work with a counselor.  · Ask people who smoke to not smoke around you.  · Avoid places that make you want (trigger) to smoke, such as:  ? Bars.  ? Parties.  ? Smoke-break areas at work.  · Spend time with people who do not smoke.  · Lower the stress in your life. Stress can make you want to smoke. Try these things to help your stress:  ? Getting regular exercise.  ? Deep-breathing exercises.  ? Yoga.  ? Meditating.  ? Doing a body scan. To do this, close your eyes, focus on one area of your body at a time from head to toe, and notice which parts of your body are tense. Try to relax the muscles in those areas.  · Download or buy apps on your mobile phone or tablet that can help you stick to your quit plan. There are many free apps, such as QuitGuide from the CDC (Centers for Disease Control and Prevention). You can find more support from smokefree.gov and other websites.    This information is not intended to replace advice given to you by your health care provider. Make sure you discuss any questions you have with your health care provider.  Document Released: 10/14/2010 Document Revised: 08/15/2017 Document Reviewed: 05/03/2016  [employer] Interactive Patient Education © 2018 [employer]  Fat and Cholesterol Restricted Diet  Getting too much fat and cholesterol in your diet may cause health problems. Following this diet helps keep your fat and cholesterol at normal levels. This can keep you from getting sick.  What types of fat should I choose?  · Choose monosaturated and polyunsaturated fats. These are found in foods such as olive oil, canola oil, flaxseeds, walnuts, almonds, and seeds.  · Eat more  "omega-3 fats. Good choices include salmon, mackerel, sardines, tuna, flaxseed oil, and ground flaxseeds.  · Limit saturated fats. These are in animal products such as meats, butter, and cream. They can also be in plant products such as palm oil, palm kernel oil, and coconut oil.  · Avoid foods with partially hydrogenated oils in them. These contain trans fats. Examples of foods that have trans fats are stick margarine, some tub margarines, cookies, crackers, and other baked goods.  What general guidelines do I need to follow?  · Check food labels. Look for the words \"trans fat\" and \"saturated fat.\"  · When preparing a meal:  ? Fill half of your plate with vegetables and green salads.  ? Fill one fourth of your plate with whole grains. Look for the word \"whole\" as the first word in the ingredient list.  ? Fill one fourth of your plate with lean protein foods.  · Eat more foods that have fiber, like apples, carrots, beans, peas, and barley.  · Eat more home-cooked foods. Eat less at restaurants and buffets.  · Limit or avoid alcohol.  · Limit foods high in starch and sugar.  · Limit fried foods.  · Cook foods without frying them. Baking, boiling, grilling, and broiling are all great options.  · Lose weight if you are overweight. Losing even a small amount of weight can help your overall health. It can also help prevent diseases such as diabetes and heart disease.  What foods can I eat?  Grains  Whole grains, such as whole wheat or whole grain breads, crackers, cereals, and pasta. Unsweetened oatmeal, bulgur, barley, quinoa, or brown rice. Corn or whole wheat flour tortillas.  Vegetables  Fresh or frozen vegetables (raw, steamed, roasted, or grilled). Green salads.  Fruits  All fresh, canned (in natural juice), or frozen fruits.  Meat and Other Protein Products  Ground beef (85% or leaner), grass-fed beef, or beef trimmed of fat. Skinless chicken or turkey. Ground chicken or turkey. Pork trimmed of fat. All fish and " seafood. Eggs. Dried beans, peas, or lentils. Unsalted nuts or seeds. Unsalted canned or dry beans.  Dairy  Low-fat dairy products, such as skim or 1% milk, 2% or reduced-fat cheeses, low-fat ricotta or cottage cheese, or plain low-fat yogurt.  Fats and Oils  Tub margarines without trans fats. Light or reduced-fat mayonnaise and salad dressings. Avocado. Olive, canola, sesame, or safflower oils. Natural peanut or almond butter (choose ones without added sugar and oil).  The items listed above may not be a complete list of recommended foods or beverages. Contact your dietitian for more options.  What foods are not recommended?  Grains  White bread. White pasta. White rice. Cornbread. Bagels, pastries, and croissants. Crackers that contain trans fat.  Vegetables  White potatoes. Corn. Creamed or fried vegetables. Vegetables in a cheese sauce.  Fruits  Dried fruits. Canned fruit in light or heavy syrup. Fruit juice.  Meat and Other Protein Products  Fatty cuts of meat. Ribs, chicken wings, cox, sausage, bologna, salami, chitterlings, fatback, hot dogs, bratwurst, and packaged luncheon meats. Liver and organ meats.  Dairy  Whole or 2% milk, cream, half-and-half, and cream cheese. Whole milk cheeses. Whole-fat or sweetened yogurt. Full-fat cheeses. Nondairy creamers and whipped toppings. Processed cheese, cheese spreads, or cheese curds.  Sweets and Desserts  Corn syrup, sugars, honey, and molasses. Candy. Jam and jelly. Syrup. Sweetened cereals. Cookies, pies, cakes, donuts, muffins, and ice cream.  Fats and Oils  Butter, stick margarine, lard, shortening, ghee, or cox fat. Coconut, palm kernel, or palm oils.  Beverages  Alcohol. Sweetened drinks (such as sodas, lemonade, and fruit drinks or punches).  The items listed above may not be a complete list of foods and beverages to avoid. Contact your dietitian for more information.  This information is not intended to replace advice given to you by your health care  provider. Make sure you discuss any questions you have with your health care provider.  Document Released: 06/18/2013 Document Revised: 08/24/2017 Document Reviewed: 03/19/2015  [employer] Interactive Patient Education © 2018 [employer]  BMI for Adults  Body mass index (BMI) is a number that is calculated from a person's weight and height. In most adults, the number is used to find how much of an adult's weight is made up of fat. BMI is not as accurate as a direct measure of body fat.  How is BMI calculated?  BMI is calculated by dividing weight in kilograms by height in meters squared. It can also be calculated by dividing weight in pounds by height in inches squared, then multiplying the resulting number by 703. Charts are available to help you find your BMI quickly and easily without doing this calculation.  How is BMI interpreted?  Health care professionals use BMI charts to identify whether an adult is underweight, at a normal weight, or overweight based on the following guidelines:  · Underweight: BMI less than 18.5.  · Normal weight: BMI between 18.5 and 24.9.  · Overweight: BMI between 25 and 29.9.  · Obese: BMI of 30 and above.    BMI is usually interpreted the same for males and females.  Weight includes both fat and muscle, so someone with a muscular build, such as an athlete, may have a BMI that is higher than 24.9. In cases like these, BMI may not accurately depict body fat. To determine if excess body fat is the cause of a BMI of 25 or higher, further assessments may need to be done by a health care provider.  Why is BMI a useful tool?  BMI is used to identify a possible weight problem that may be related to a medical problem or may increase the risk for medical problems. BMI can also be used to promote changes to reach a healthy weight.  This information is not intended to replace advice given to you by your health care provider. Make sure you discuss any questions you have with your health care  provider.  Document Released: 08/29/2005 Document Revised: 04/27/2017 Document Reviewed: 05/15/2015  [employer] Interactive Patient Education © 2018 [employer]    Cardiomyopathy, Adult  Cardiomyopathy is a long-term (chronic) disease of the heart muscle. The disease makes the heart muscle thick, weak, or stiff. As a result, the heart works harder to pump blood. Over time, cardiomyopathy can lead to heart failure.  There are several types of cardiomyopathy:  · Dilated cardiomyopathy. This type causes the ventricles to become weak and stretched out.  · Hypertrophic cardiomyopathy. This type causes the heart muscle to thicken.  · Restrictive cardiomyopathy. This type causes the heart muscle to become stiff.  · Ischemic cardiomyopathy. This type involves narrowing arteries that cause the walls of the heart to get thinner.  · Peripartum cardiomyopathy. This type occurs during pregnancy or shortly after pregnancy.    What are the causes?  This condition may be caused by:  · A gene that is passed down (inherited) from a family member.  · A medical condition that damages the heart, such as:  ? Diabetes.  ? High blood pressure.  ? Viral infection of the heart.  ? Heart attack.  ? Coronary heart disease.  · Alcoholism.  · Using illegal drugs or some prescription medicines.  · Pregnancy.  · Your body absorbing and storing too much iron (hemochromatosis).  · Autoimmune diseases, connective tissue diseases, endocrine diseases, and muscle diseases.  · Cancer treatments.  · Buildup of proteins in your organs (amyloidosis), or inflammation in your organs (sarcoidosis).    Often, the cause is not known.  What increases the risk?  This condition is more likely to develop in people who:  · Have a family history of cardiomyopathy or other heart problems.  · Are overweight or obese.  · Use illegal drugs.  · Abuse alcohol.  · Have a medical condition that damages the heart.    What are the signs or symptoms?  Symptoms of this condition  include:  · Shortness of breath, especially during activity.  · Fatigue.  · An irregular heartbeat and heart murmurs.  · Dizziness.  · Light-headedness.  · Fainting.  · Chest pain.  · Coughing.  · Swelling in the lower legs, ankles, feet, abdomen, and neck veins.    Often, people with this condition have no symptoms.  How is this diagnosed?  This condition is diagnosed based on:  · Your symptoms and medical history.  · A physical exam.  · Tests.    Tests may include:  · Blood tests.  · Imaging studies of your heart, such as:  ? X-rays.  ? An echocardiogram.  ? An MRI.  · An electrocardiogram (ECG). This records your heart's electrical activity.  · A test in which you wear a portable device (event monitor) to record your heart's electrical activity while you go about your day.  · A stress test. This monitors your heart's activity while exercising.  · Cardiac catheterization. This procedure checks the blood pressure and blood flow in your heart.  · An angiogram. This is an injection of dye into your arteries before imaging studies are taken.  · Heart tissue biopsy. This removes a sample of heart tissue for examination.    How is this treated?    Treatment for this condition depends on the type of cardiomyopathy you have and the severity of your symptoms. If you do not have symptoms, you may not need treatment. If you need treatment, it may include:  · Lifestyle changes, such as:  ? Eating a heart-healthy diet that includes plenty of fruits, vegetables, and whole grains, and cutting down on salt (sodium).  ? Maintaining a healthy weight, and losing weight, if needed.  ? Getting regular exercise.  ? Quitting smoking, if you smoke.  ? Avoiding alcohol.  ? Medicine to:  § Lower your blood pressure.  § Slow down your heart rate.  § Keep your heart beating in a steady rhythm.  § Clear excess fluids from your body.  § Prevent blood clots.  § Balance minerals (electrolytes) in your body and get rid of extra sodium in your  body.  § Reduce inflammation.  § Strengthen your heartbeat.  ? Surgery to:  § Repair a defect.  § Remove thickened tissue.  § Destroy tissues in the area of abnormal electrical activity (ablation).  § Implant a device to treat serious heart rhythm problems (implantable cardioverter-defibrillator, or ICD), or a pacemaker.  § Replace your heart (heart transplant) if all other treatments have failed (end stage).    Other treatments may include cardiac resynchronization therapy (CRT) or a left ventricular assist device (LVAD).  Follow these instructions at home:  Lifestyle  · Eat a heart-healthy diet. Work with your health care provider or a registered dietitian to learn about healthy eating options.  · Maintain a healthy weight.  · Stay physically active. Ask your health care provider to suggest some activities that are good for you.  · Do not use any products that contain nicotine or tobacco, such as cigarettes and e-cigarettes. If you need help quitting, ask your health care provider.  · Limit alcohol intake to no more than one drink per day for nonpregnant women and no more than two drinks per day for men. One drink equals 12 oz of beer, 5 oz of wine, or 1½ oz of hard liquor.  · Try to get at least 7 hours of sleep each night.  · Find healthy ways to manage stress.  General instructions  · Take over-the-counter and prescription medicines only as told by your health care provider. Some medicines can be dangerous for your heart.  · Tell all health care providers, including your dentist, that you have cardiomyopathy. When you visit the dentist or have surgery, ask your health care provider if you need antibiotics before having dental care or before the surgery.  · Ask your health care provider if you should wear a medical identification bracelet. This may be important if you have a pacemaker or a defibrillator.  · Make sure you get all recommended vaccinations and an annual flu shot.  · Work closely with your health  care provider to manage any long-lasting (chronic) conditions.  · Keep all follow-up visits as told by your health care provider. This is important, even if you do not have any symptoms. Your health care provider may need to make sure your condition is not getting worse.  How is this prevented?  This condition cannot be prevented. Parents, siblings, and children of people with this condition may be at risk for the condition. It is a good idea for them to get screened for the condition because it is best when cardiomyopathy is found early. Screening is done with an ECG and echocardiogram.  People who want to start a family may also want to meet with a genetic counselor to discuss the risk of having a child with cardiomyopathy.  Contact a health care provider if:  · Your symptoms get worse.  · You have new symptoms.  Get help right away if:  · You have severe chest pain.  · You have shortness of breath.  · You cough up pink, bubbly material.  · You have sudden sweating.  · You feel nauseous and you vomit.  · You suddenly become light-headed or dizzy.  · You feel your heart beating very quickly.  · It feels like your heart is skipping beats.  These symptoms may represent a serious problem that is an emergency. Do not wait to see if the symptoms will go away. Get medical help right away. Call your local emergency services (911 in the [place] Do not drive yourself to the hospital.  This information is not intended to replace advice given to you by your health care provider. Make sure you discuss any questions you have with your health care provider.  Document Released: 03/02/2006 Document Revised: 08/15/2017 Document Reviewed: 06/19/2017  [employer] Interactive Patient Education © 2018 [employer]

## 2018-10-02 NOTE — PROGRESS NOTES
Subjective   Josiah Orta Jr. is a 58 y.o. male     Chief Complaint   Patient presents with   • Follow-up     Pt in office for 6 wk F/U appt    • Coronary Artery Disease       HPI    Problem list:    1.  CAD  1.1 left heart catheter 6/27/18-long total occlusion of the circumflex, ongoing circumflex at least 50% narrowing before providing a small nests the posterior lateral marginal branches with collaterals to the distal right coronary artery, proximal and midportion of the LAD 30-50% and 20-40% stenosis in the proximal and mid vessel and 50% or greater stenosis at the origins of the second and third diagonals, EF 30-35%, left ventricular end-diastolic pressure 28-30; referred to [place] for possible  if not then may need CABG.  2.  Hypertension  3.  Dyslipidemia  4.  Shortness of breath  5.  Smoking habituation  6.  Carotid artery disease  6.1 carotid artery ultrasound 7/23/18-both common carotids patent, diffuse densely plaque in the bulb and bifurcation on the right resulting in 50-75% stenosis in the bulb and proximal right external carotid artery.  16-49% stenosis maximum in both internal carotid arteries, antegrade flow demonstrated both for tumor arteries probable moderate but potentially hemodynamically significant stenosis in the bulb and bifurcation on the right  6.2 CTA of carotids 8/28/18-con carotid arteries bilaterally less than 50% diameter, 70% right external carotid  7.  Ischemic cardiomyopathy  7.1 MUGA Scan 7/23/18 - EF 6%    Patient is a 58-year-old male who presents today for a follow-up with his daughter at his side.  He denies any chest pain, pressure, palpitations, fluttering, dizziness, presyncope, syncope, orthopnea, PND or edema.  He says that he does get short of breath when active and at rest.  He says he is very fatigued and has no energy.  Patient is not wearing his LifeVest says he says he couldn't do anything with it on.  Re-educated the patient on his risk for sudden  cardiac death and he gave verbal understanding.  Patient does smoke one pack a day still.      Current Outpatient Prescriptions   Medication Sig Dispense Refill   • aspirin 81 MG tablet Take 1 tablet by mouth Daily. 30 tablet 11   • atorvastatin (LIPITOR) 40 MG tablet Take 1 tablet by mouth Every Night. 30 tablet 11   • clopidogrel (PLAVIX) 75 MG tablet Take 1 tablet by mouth Daily. 30 tablet 11   • isosorbide mononitrate (IMDUR) 30 MG 24 hr tablet Take 1 tablet by mouth Daily. 30 tablet 11   • metoprolol tartrate (LOPRESSOR) 50 MG tablet Take 50 mg by mouth 2 (Two) Times a Day.     • nitroglycerin (NITROSTAT) 0.4 MG SL tablet 1 under the tongue as needed for angina, may repeat q5mins for up three doses 25 tablet 3   • sacubitril-valsartan (ENTRESTO) 49-51 MG tablet Take 1 tablet by mouth Every 12 (Twelve) Hours. 60 tablet 11   • technetium labeled red blood cells (ULTRATAG) Infuse 1 dose into a venous catheter Once for 1 dose. 1 dose 0     No current facility-administered medications for this visit.        ALLERGIES    Patient has no known allergies.    Past Medical History:   Diagnosis Date   • Borderline diabetes    • GERD (gastroesophageal reflux disease)    • Hyperlipidemia    • Hypertension    • Myocardial infarction (CMS/HCC)        Social History     Social History   • Marital status:      Spouse name: N/A   • Number of children: 2   • Years of education: N/A     Occupational History   •  Unemployed     Social History Main Topics   • Smoking status: Current Every Day Smoker     Packs/day: 1.50     Years: 45.00     Types: Cigarettes   • Smokeless tobacco: Never Used      Comment: patient is now on nicotine patches    • Alcohol use No   • Drug use: No   • Sexual activity: Defer     Other Topics Concern   • Not on file     Social History Narrative    Lives in [place], [place] with significant other       Family History   Problem Relation Age of Onset   • COPD Mother    • Heart disease Mother    •  Hypertension Father    • Myasthenia gravis Father        Review of Systems   Constitutional: Positive for fatigue (Pt states that he's easily fatigued, but no more than last appt. ). Negative for diaphoresis.   HENT: Positive for congestion (Pt states that he's been having sinus issues for a few months. ), rhinorrhea and sneezing.    Eyes: Positive for visual disturbance (Has glasses that he should wear daily, but needs lenses changed. ).   Respiratory: Positive for cough (Pt states that he has a sometimes productive cough- states the phlem is usually clear, but can be yellow in color) and shortness of breath (Pt states that he can get SoB when active or at rest). Negative for chest tightness.    Cardiovascular: Negative for chest pain, palpitations and leg swelling.   Gastrointestinal: Negative for abdominal pain, nausea and vomiting.   Endocrine: Negative for cold intolerance and heat intolerance.   Genitourinary: Positive for dysuria (Pt states that he's had pain while urinating for a few days- states it comes and goes. Denies dark urine. ) and frequency (Pt states that he's urinating more often than he usually does). Negative for urgency.   Musculoskeletal: Negative for arthralgias, back pain and neck pain.   Skin: Negative for rash and wound.   Allergic/Immunologic: Positive for environmental allergies (Seasonal allergies). Negative for food allergies.   Neurological: Positive for headaches (Pt states that he's been getting daily HA lasting about 1-2 hours. Left temple pain. Pain scale 8/10. Thinks needing new glasses may be the cause. ). Negative for dizziness, syncope, light-headedness and numbness.   Hematological: Bruises/bleeds easily (Pt sttaes that he bleeds easily).   Psychiatric/Behavioral: Positive for agitation (Pt states that he's easily aggitated) and confusion (Pt states that he's easily confused). Negative for sleep disturbance (Pt denies getting enough air at night or gasping. States he doesn't  "sleep much. ). The patient is nervous/anxious (Pt states that he's easily nervous/anxious).        Objective   /87 (BP Location: Left arm, Patient Position: Sitting)   Pulse 86   Ht 172.7 cm (68\")   Wt 83.2 kg (183 lb 6.4 oz)   SpO2 98%   BMI 27.89 kg/m²   Vitals:    10/02/18 1512   BP: 151/87   BP Location: Left arm   Patient Position: Sitting   Pulse: 86   SpO2: 98%   Weight: 83.2 kg (183 lb 6.4 oz)   Height: 172.7 cm (68\")      Lab Results (most recent)     None        Physical Exam   Constitutional: He is oriented to person, place, and time. Vital signs are normal. He appears well-developed and well-nourished. He is active and cooperative.   HENT:   Head: Normocephalic.   Eyes: Lids are normal.   Neck: Normal carotid pulses, no hepatojugular reflux and no JVD present. Carotid bruit is present (soft ).   Cardiovascular: Normal rate, regular rhythm and normal heart sounds.    Pulses:       Radial pulses are 2+ on the right side, and 2+ on the left side.        Dorsalis pedis pulses are 2+ on the right side, and 2+ on the left side.        Posterior tibial pulses are 2+ on the right side, and 2+ on the left side.   No edema BLE.   Pulmonary/Chest: Effort normal and breath sounds normal.   Abdominal: Normal appearance and bowel sounds are normal.   Neurological: He is alert and oriented to person, place, and time.   Skin: Skin is warm, dry and intact.   Psychiatric: He has a normal mood and affect. His speech is normal and behavior is normal. Judgment and thought content normal. Cognition and memory are normal.       Procedure   Procedures         Assessment/Plan      Diagnosis Plan   1. Smoking     2. Coronary artery disease involving native coronary artery of native heart with angina pectoris (CMS/HCC)  isosorbide mononitrate (IMDUR) 30 MG 24 hr tablet   3. Precordial pain  isosorbide mononitrate (IMDUR) 30 MG 24 hr tablet    atorvastatin (LIPITOR) 40 MG tablet   4. Essential hypertension  atorvastatin " (LIPITOR) 40 MG tablet   5. SOB (shortness of breath)  atorvastatin (LIPITOR) 40 MG tablet   6. Palpitations  atorvastatin (LIPITOR) 40 MG tablet   7. Cardiomyopathy, unspecified type (CMS/HCC)  sacubitril-valsartan (ENTRESTO) 49-51 MG tablet    technetium labeled red blood cells (ULTRATAG)    Nuclear Medicine Cardiac Blood Pool [facility]    Nuclear Medicine Cardiac Blood Pool [facility]   8. Shortness of breath  sacubitril-valsartan (ENTRESTO) 49-51 MG tablet    technetium labeled red blood cells (ULTRATAG)    Nuclear Medicine Cardiac Blood Pool [facility]    Nuclear Medicine Cardiac Blood Pool [facility]       Return in about 2 months (around 12/2/2018).    CAD-patient is on aspirin, statin and beta.  Chest pain-resolved on isosorbide.  Hypertension-slightly elevated today.  Shortness of breath last cardiomyopathy-patient will have MUGA scan.  He will also increase his entrusted to the second dose level of 49/51.  He will continue his medication regimen otherwise.  He will follow-up in 2 months or sooner if any changes.      I advised patient to have them let me know when the MUGA scan is completed therefore we can determine if his EF has improved enough to not need a AICD however if it is still low going get him referred at that time.  They will think about if they wanted to locally or go to [facility]        I advised Josiah of the risks of continuing to use tobacco, and I provided him with tobacco cessation educational materials in the After Visit Summary.     During this visit, I spent <3 minutes counseling the patient regarding tobacco cessation.    Patient's Body mass index is 27.89 kg/m². BMI is above normal parameters. Recommendations include: educational material.      Electronically signed by:

## 2018-10-15 ENCOUNTER — APPOINTMENT (OUTPATIENT)
Dept: CARDIOLOGY | Facility: HOSPITAL | Age: 58
End: 2018-10-15